# Patient Record
Sex: FEMALE | Race: WHITE | Employment: OTHER | URBAN - METROPOLITAN AREA
[De-identification: names, ages, dates, MRNs, and addresses within clinical notes are randomized per-mention and may not be internally consistent; named-entity substitution may affect disease eponyms.]

---

## 2018-04-03 ENCOUNTER — HOSPITAL ENCOUNTER (OUTPATIENT)
Dept: MRI IMAGING | Age: 73
Discharge: HOME | End: 2018-04-03
Attending: ORTHOPAEDIC SURGERY
Payer: MEDICARE

## 2018-04-03 DIAGNOSIS — M43.16 SPONDYLOLISTHESIS OF LUMBAR REGION: ICD-10-CM

## 2018-04-03 PROCEDURE — 72148 MRI LUMBAR SPINE W/O DYE: CPT

## 2018-04-30 RX ORDER — LUTEIN 6 MG
1 TABLET ORAL
COMMUNITY

## 2018-04-30 RX ORDER — CHOLECALCIFEROL (VITAMIN D3) 125 MCG
2 CAPSULE ORAL
COMMUNITY
End: 2018-05-11

## 2018-04-30 RX ORDER — CLONAZEPAM 2 MG/1
2 TABLET ORAL
COMMUNITY

## 2018-04-30 RX ORDER — GUAIFENESIN 100 MG/5ML
81 LIQUID (ML) ORAL
COMMUNITY
End: 2018-05-11

## 2018-04-30 RX ORDER — FLUTICASONE PROPIONATE 50 MCG
1 SPRAY, SUSPENSION (ML) NASAL
COMMUNITY

## 2018-05-01 ENCOUNTER — HOSPITAL ENCOUNTER (OUTPATIENT)
Dept: PREADMISSION TESTING | Age: 73
Discharge: HOME | End: 2018-05-01
Payer: MEDICARE

## 2018-05-01 VITALS
BODY MASS INDEX: 25.56 KG/M2 | HEART RATE: 66 BPM | OXYGEN SATURATION: 99 % | SYSTOLIC BLOOD PRESSURE: 139 MMHG | RESPIRATION RATE: 20 BRPM | WEIGHT: 149.69 LBS | TEMPERATURE: 98.4 F | HEIGHT: 64 IN | DIASTOLIC BLOOD PRESSURE: 64 MMHG

## 2018-05-01 LAB
ABO + RH BLD: NORMAL
ALBUMIN SERPL-MCNC: 4 G/DL (ref 3.5–5)
ALBUMIN/GLOB SERPL: 1.4 {RATIO} (ref 1.1–2.2)
ALP SERPL-CCNC: 69 U/L (ref 45–117)
ALT SERPL-CCNC: 26 U/L (ref 12–78)
ANION GAP SERPL CALC-SCNC: 5 MMOL/L (ref 5–15)
APPEARANCE UR: CLEAR
APTT PPP: 26.5 SEC (ref 22.1–32)
AST SERPL-CCNC: 14 U/L (ref 15–37)
ATRIAL RATE: 50 BPM
BACTERIA URNS QL MICRO: NEGATIVE /HPF
BASOPHILS # BLD: 0 K/UL (ref 0–0.1)
BASOPHILS NFR BLD: 0 % (ref 0–1)
BILIRUB SERPL-MCNC: 0.3 MG/DL (ref 0.2–1)
BILIRUB UR QL: NEGATIVE
BLOOD GROUP ANTIBODIES SERPL: NORMAL
BUN SERPL-MCNC: 14 MG/DL (ref 6–20)
BUN/CREAT SERPL: 19 (ref 12–20)
CALCIUM SERPL-MCNC: 9.3 MG/DL (ref 8.5–10.1)
CALCULATED P AXIS, ECG09: 21 DEGREES
CALCULATED R AXIS, ECG10: 0 DEGREES
CALCULATED T AXIS, ECG11: 26 DEGREES
CHLORIDE SERPL-SCNC: 105 MMOL/L (ref 97–108)
CO2 SERPL-SCNC: 33 MMOL/L (ref 21–32)
COLOR UR: NORMAL
CREAT SERPL-MCNC: 0.75 MG/DL (ref 0.55–1.02)
DIAGNOSIS, 93000: NORMAL
DIFFERENTIAL METHOD BLD: NORMAL
EOSINOPHIL # BLD: 0.1 K/UL (ref 0–0.4)
EOSINOPHIL NFR BLD: 1 % (ref 0–7)
EPITH CASTS URNS QL MICRO: NORMAL /LPF
ERYTHROCYTE [DISTWIDTH] IN BLOOD BY AUTOMATED COUNT: 13.9 % (ref 11.5–14.5)
EST. AVERAGE GLUCOSE BLD GHB EST-MCNC: 114 MG/DL
GLOBULIN SER CALC-MCNC: 2.9 G/DL (ref 2–4)
GLUCOSE SERPL-MCNC: 85 MG/DL (ref 65–100)
GLUCOSE UR STRIP.AUTO-MCNC: NEGATIVE MG/DL
HBA1C MFR BLD: 5.6 % (ref 4.2–6.3)
HCT VFR BLD AUTO: 43.1 % (ref 35–47)
HGB BLD-MCNC: 13.8 G/DL (ref 11.5–16)
HGB UR QL STRIP: NEGATIVE
HYALINE CASTS URNS QL MICRO: NORMAL /LPF (ref 0–5)
IMM GRANULOCYTES # BLD: 0 K/UL (ref 0–0.04)
IMM GRANULOCYTES NFR BLD AUTO: 0 % (ref 0–0.5)
INR PPP: 0.9 (ref 0.9–1.1)
KETONES UR QL STRIP.AUTO: NEGATIVE MG/DL
LEUKOCYTE ESTERASE UR QL STRIP.AUTO: NEGATIVE
LYMPHOCYTES # BLD: 1.8 K/UL (ref 0.8–3.5)
LYMPHOCYTES NFR BLD: 25 % (ref 12–49)
MCH RBC QN AUTO: 30.8 PG (ref 26–34)
MCHC RBC AUTO-ENTMCNC: 32 G/DL (ref 30–36.5)
MCV RBC AUTO: 96.2 FL (ref 80–99)
MONOCYTES # BLD: 0.4 K/UL (ref 0–1)
MONOCYTES NFR BLD: 6 % (ref 5–13)
NEUTS SEG # BLD: 4.8 K/UL (ref 1.8–8)
NEUTS SEG NFR BLD: 68 % (ref 32–75)
NITRITE UR QL STRIP.AUTO: NEGATIVE
NRBC # BLD: 0 K/UL (ref 0–0.01)
NRBC BLD-RTO: 0 PER 100 WBC
P-R INTERVAL, ECG05: 182 MS
PH UR STRIP: 5.5 [PH] (ref 5–8)
PLATELET # BLD AUTO: 195 K/UL (ref 150–400)
PMV BLD AUTO: 11.8 FL (ref 8.9–12.9)
POTASSIUM SERPL-SCNC: 3.7 MMOL/L (ref 3.5–5.1)
PROT SERPL-MCNC: 6.9 G/DL (ref 6.4–8.2)
PROT UR STRIP-MCNC: NEGATIVE MG/DL
PROTHROMBIN TIME: 9.5 SEC (ref 9–11.1)
Q-T INTERVAL, ECG07: 436 MS
QRS DURATION, ECG06: 88 MS
QTC CALCULATION (BEZET), ECG08: 397 MS
RBC # BLD AUTO: 4.48 M/UL (ref 3.8–5.2)
RBC #/AREA URNS HPF: NORMAL /HPF (ref 0–5)
SODIUM SERPL-SCNC: 143 MMOL/L (ref 136–145)
SP GR UR REFRACTOMETRY: 1.01 (ref 1–1.03)
SPECIMEN EXP DATE BLD: NORMAL
THERAPEUTIC RANGE,PTTT: NORMAL SECS (ref 58–77)
UA: UC IF INDICATED,UAUC: NORMAL
UROBILINOGEN UR QL STRIP.AUTO: 0.2 EU/DL (ref 0.2–1)
VENTRICULAR RATE, ECG03: 50 BPM
WBC # BLD AUTO: 7.1 K/UL (ref 3.6–11)
WBC URNS QL MICRO: NORMAL /HPF (ref 0–4)

## 2018-05-01 PROCEDURE — 93005 ELECTROCARDIOGRAM TRACING: CPT

## 2018-05-01 PROCEDURE — 85025 COMPLETE CBC W/AUTO DIFF WBC: CPT | Performed by: ORTHOPAEDIC SURGERY

## 2018-05-01 PROCEDURE — 85610 PROTHROMBIN TIME: CPT | Performed by: ORTHOPAEDIC SURGERY

## 2018-05-01 PROCEDURE — 80053 COMPREHEN METABOLIC PANEL: CPT | Performed by: ORTHOPAEDIC SURGERY

## 2018-05-01 PROCEDURE — 36415 COLL VENOUS BLD VENIPUNCTURE: CPT | Performed by: ORTHOPAEDIC SURGERY

## 2018-05-01 PROCEDURE — 81001 URINALYSIS AUTO W/SCOPE: CPT | Performed by: ORTHOPAEDIC SURGERY

## 2018-05-01 PROCEDURE — 85730 THROMBOPLASTIN TIME PARTIAL: CPT | Performed by: ORTHOPAEDIC SURGERY

## 2018-05-01 PROCEDURE — 83036 HEMOGLOBIN GLYCOSYLATED A1C: CPT | Performed by: ORTHOPAEDIC SURGERY

## 2018-05-01 PROCEDURE — 86900 BLOOD TYPING SEROLOGIC ABO: CPT | Performed by: ORTHOPAEDIC SURGERY

## 2018-05-01 NOTE — H&P
PAT Pre-Op History & Physical    Patient: Jay Molina                  MRN: [id number]          SSN: [id number]  YOB: 1945          Age: 81 y.o. Sex: female                Subjective:     Patient is a 81 y.o.  female who presents with history of low back pain that has been going on for years and gotten progressively worse over the last six months. She has limited ROM with bending. Pain is across lower back and down left leg to below her knee. She has recently noticed the left leg starting to have pain also. She describes the pain as shooting that will go up her back. Pain ranges from 4/10-9/10. Nothing specific makes the pain worse. Sleep is affected. She has failed PT, Tramadol, NSAID, ncarcotics, muscle relaxants. The patient was evaluated in the surgeon's office and it was determined that the most appropriate plan of care is to proceed with surgical intervention. Patient's PCP Nancie Vincent MD      Past Medical History:   Diagnosis Date    Colitis, ischemic ([facility] [address] 2017    Headache     Other ill-defined conditions(799.89)     high cholesterol    Other ill-defined conditions(799.89)     sinus congestion    Thyroid disease     hypothyroidism      Past Surgical History:   Procedure Laterality Date    BREAST SURGERY PROCEDURE UNLISTED  1969    cyst on left breast    HX CERVICAL FUSION N/A     HX COLONOSCOPY  2017    HX HYSTERECTOMY  1983    HX ORTHOPAEDIC  2001    right shoulder    HX OTHER SURGICAL  2005    benign right breast tumor      Prior to Admission medications    Medication Sig Start Date End Date Taking? Authorizing Provider   multivit with min-folic acid (ADULT MULTIVITAMIN GUMMIES) 200 mcg chew Take 2 Gum by mouth daily. Yes Historical Provider   clonazePAM (KLONOPIN) 2 mg tablet Take 2 mg by mouth nightly. Yes Historical Provider   aspirin 81 mg chewable tablet Take 81 mg by mouth nightly.    Yes Historical Provider   lutein 6 mg tab Take 1 Tab by mouth nightly. Indications: eye health   Yes Historical Provider   fluticasone (FLONASE) 50 mcg/actuation nasal spray 1 [place] by Both Nostrils route nightly. Indications: Allergic Rhinitis   Yes Historical Provider   naproxen sodium 220 mg cap Take 2 Tabs by mouth daily as needed. Indications: OSTEOARTHRITIS   Yes Historical Provider   PSEUDOEPHEDRINE/ACETAMINOPHEN (TYLENOL SINUS PO) Take 1 Tab by mouth two (2) times a day. Yes Historical Provider   levothyroxine (SYNTHROID) 50 mcg tablet Take 50 mcg by mouth every morning. Yes Historical Provider   ATORVASTATIN CALCIUM (ATORVASTATIN PO) Take 10 mg by mouth nightly. Yes Historical Provider        No Known Allergies     Social History   Substance Use Topics    Smoking status: Never Smoker    Smokeless tobacco: Never Used    Alcohol use 0.5 oz/week     1 Glasses of wine per week      History   Drug Use No     Family History   Problem Relation Age of Onset    Stroke Mother     Heart Disease Mother     Lung Disease Mother     Lung Disease Father     Heart Disease Father     Emphysema Father     Heart Disease Brother     Diabetes Brother     No Known Problems Brother     Malignant Hyperthermia Neg Hx     Pseudocholinesterase Deficiency Neg Hx     Delayed Awakening Neg Hx     Post-op Nausea/Vomiting Neg Hx     Emergence Delirium Neg Hx     Post-op Cognitive Dysfunction Neg Hx          Review of Systems    Patient denies difficulty swallowing, mouth sores, or loose teeth. Patient denies any recent dental procedures or any planned prior to surgery. Patient denies chest pain, tightness, pain radiating down left arm, palpitations. Denies dizziness, visual disturbances, or lightheadedness. Patient denies shortness of breath, wheezing, cough, fever, or chills. Patient denies diarrhea, constipation, or abdominal pain. Patient denies urinary problems including dysuria, hesitancy, urgency, or incontinence.  Denies skin breakdown, rashes, insect bites or open area. Objective:     Patient Vitals for the past 24 hrs:   Temp Pulse Resp BP SpO2   18 1255 98.4 °F (36.9 °C) 66 20 139/64 99 %     Temp (24hrs), Av.4 °F (36.9 °C), Min:98.4 °F (36.9 °C), Max:98.4 °F (36.9 °C)    Body mass index is 25.69 kg/(m^2). Wt Readings from Last 1 Encounters:   18 67.9 kg (149 lb 11.1 oz)        Physical Exam:     General: Pleasant,  cooperative, no apparent distress, appears stated age. Eyes: Conjunctivae/corneas clear. EOMs intact. Nose: Nares normal.   Mouth/Throat: Lips, mucosa, and tongue normal. Teeth and gums normal.   Lungs: Clear to auscultation bilaterally. Heart: Regular rate and rhythm, S1, S2 normal. No murmur, click, rub or gallop. Abdomen: Soft, non-tender. Bowel sounds normal. No distention. Musculoskeletal:  Unremarkable. Extremities:  Extremities normal, atraumatic, no cyanosis or edema. Calves                                 supple, non tender to palpation. Pulses: 2+ and symmetric bilateral upper extremities. Cap. refill <2 seconds   Skin: Skin color, texture, turgor normal. No visible open areas, examined fully clothed   Neurologic: CN II-XII grossly intact. Alert and oriented x3. Labs:   Recent Results (from the past 72 hour(s))   CBC WITH AUTOMATED DIFF    Collection Time: 18  1:28 PM   Result Value Ref Range    WBC 7.1 3.6 - 11.0 K/uL    RBC 4.48 3.80 - 5.20 M/uL    HGB 13.8 11.5 - 16.0 g/dL    HCT 43.1 35.0 - 47.0 %    MCV 96.2 80.0 - 99.0 FL    MCH 30.8 26.0 - 34.0 PG    MCHC 32.0 30.0 - 36.5 g/dL    RDW 13.9 11.5 - 14.5 %    PLATELET 688 017 - 314 K/uL    MPV 11.8 8.9 - 12.9 FL    NRBC 0.0 0  WBC    ABSOLUTE NRBC 0.00 0.00 - 0.01 K/uL    NEUTROPHILS 68 32 - 75 %    LYMPHOCYTES 25 12 - 49 %    MONOCYTES 6 5 - 13 %    EOSINOPHILS 1 0 - 7 %    BASOPHILS 0 0 - 1 %    IMMATURE GRANULOCYTES 0 0.0 - 0.5 %    ABS. NEUTROPHILS 4.8 1.8 - 8.0 K/UL    ABS. LYMPHOCYTES 1.8 0.8 - 3.5 K/UL    ABS.  MONOCYTES 0.4 0.0 - 1.0 K/UL    ABS. EOSINOPHILS 0.1 0.0 - 0.4 K/UL    ABS. BASOPHILS 0.0 0.0 - 0.1 K/UL    ABS. IMM. GRANS. 0.0 0.00 - 0.04 K/UL    DF AUTOMATED     METABOLIC PANEL, COMPREHENSIVE    Collection Time: 05/01/18  1:28 PM   Result Value Ref Range    Sodium 143 136 - 145 mmol/L    Potassium 3.7 3.5 - 5.1 mmol/L    Chloride 105 97 - 108 mmol/L    CO2 33 (H) 21 - 32 mmol/L    Anion gap 5 5 - 15 mmol/L    Glucose 85 65 - 100 mg/dL    BUN 14 6 - 20 MG/DL    Creatinine 0.75 0.55 - 1.02 MG/DL    BUN/Creatinine ratio 19 12 - 20      GFR est AA >60 >60 ml/min/1.73m2    GFR est non-AA >60 >60 ml/min/1.73m2    Calcium 9.3 8.5 - 10.1 MG/DL    Bilirubin, total 0.3 0.2 - 1.0 MG/DL    ALT (SGPT) 26 12 - 78 U/L    AST (SGOT) 14 (L) 15 - 37 U/L    Alk.  phosphatase 69 45 - 117 U/L    Protein, total 6.9 6.4 - 8.2 g/dL    Albumin 4.0 3.5 - 5.0 g/dL    Globulin 2.9 2.0 - 4.0 g/dL    A-G Ratio 1.4 1.1 - 2.2     HEMOGLOBIN A1C WITH EAG    Collection Time: 05/01/18  1:28 PM   Result Value Ref Range    Hemoglobin A1c 5.6 4.2 - 6.3 %    Est. average glucose 114 mg/dL   CULTURE, MRSA    Collection Time: 05/01/18  1:28 PM   Result Value Ref Range    Special Requests: NO SPECIAL REQUESTS      Culture result: MRSA NOT PRESENT AT 14 HOURS     PROTHROMBIN TIME + INR    Collection Time: 05/01/18  1:28 PM   Result Value Ref Range    INR 0.9 0.9 - 1.1      Prothrombin time 9.5 9.0 - 11.1 sec   PTT    Collection Time: 05/01/18  1:28 PM   Result Value Ref Range    aPTT 26.5 22.1 - 32.0 sec    aPTT, therapeutic range     58.0 - 77.0 SECS   URINALYSIS W/ REFLEX CULTURE    Collection Time: 05/01/18  1:28 PM   Result Value Ref Range    Color YELLOW/STRAW      Appearance CLEAR CLEAR      Specific gravity 1.014 1.003 - 1.030      pH (UA) 5.5 5.0 - 8.0      Protein NEGATIVE  NEG mg/dL    Glucose NEGATIVE  NEG mg/dL    Ketone NEGATIVE  NEG mg/dL    Bilirubin NEGATIVE  NEG      Blood NEGATIVE  NEG      Urobilinogen 0.2 0.2 - 1.0 EU/dL    Nitrites NEGATIVE NEG      Leukocyte Esterase NEGATIVE  NEG      WBC 0-4 0 - 4 /hpf    RBC 0-5 0 - 5 /hpf    Epithelial cells FEW FEW /lpf    Bacteria NEGATIVE  NEG /hpf    UA:UC IF INDICATED CULTURE NOT INDICATED BY UA RESULT CNI      Hyaline cast 0-2 0 - 5 /lpf   TYPE & SCREEN    Collection Time: 05/01/18  1:28 PM   Result Value Ref Range    Crossmatch Expiration 05/10/2018     ABO/Rh(D) O POSITIVE     Antibody screen NEG    EKG, 12 LEAD, INITIAL    Collection Time: 05/01/18  1:46 PM   Result Value Ref Range    Ventricular Rate 50 BPM    Atrial Rate 50 BPM    P-R Interval 182 ms    QRS Duration 88 ms    Q-T Interval 436 ms    QTC Calculation (Bezet) 397 ms    Calculated P Axis 21 degrees    Calculated R Axis 0 degrees    Calculated T Axis 26 degrees    Diagnosis       Sinus bradycardia  Low voltage QRS  Borderline ECG  When compared with ECG of 30-JAN-2013 09:24,  No significant change was found  Confirmed by Marlene Salcido MD., [employer] Finder ([id number]) on 5/1/2018 2:52:09 PM         Assessment:     Lumbar adjacent segment disease with spondylolisthesis [M51.36, M43.16]  Lumbar stenosis with neurogenic claudication [M48.062]    Plan:     Scheduled for L2-L5 LAMINECTOMY, L3-L5 FUSION, INSTRUMENTATION, TLIF, BONE GRAFT . All labs reviewed and unremarkable. EKG reviewed.  MRSA pending    Elinor Hooper NP

## 2018-05-01 NOTE — PERIOP NOTES
[facility] [address]    MAIN OR [phone]    MAIN PRE OP [phone]    AMBULATORY PRE OP [phone]    PRE-ADMISSION TESTING [phone]       Surgery Date:   Monday 5/7/18        Is surgery arrival time given by surgeon? NO  If NO, [facility] staff will call you between 3 and 7pm the day before your surgery with your arrival time. (If your surgery is on a Monday, we will call you the Friday before.)    Call [phone] after 7pm Monday-Friday if you did not receive your arrival time.     Answers to Common Questions   When You  Arrive   Arrive at the [facility] [address] [facility] on the day of your surgery  Have your insurance card, photo ID, and any copayment (if needed)     Food   and   Drink NO food or drink after midnight the night before surgery    This means NO water, gum, mints, coffee, juice, etc.  No alcohol (beer, wine, liquor) 24 hours before and after surgery     Medicine to   TAKE   Morning of Surgery   MEDICATIONS TO TAKE THE MORNING OF SURGERY WITH A SIP OF WATER:    Flonase, Levothyroxine     Medicine  To  STOP FOR PAIN   NO Aspirin for pain    NO Non-Steroidal Anti-Inflammatory Drugs (NSAIDs:   for example, Ibuprofen (Advil, Motrin), Naproxen (Aleve)   STOP herbal supplements and vitamins 1 week before surgery   You can take Tylenol  follow instructions on the bottle     Blood  Thinners    If you take Aspirin, Plavix, Coumadin, blood-thinning or anti-clot medicine, talk to your surgeon and/or follow the instructions from the doctor who told you to take that medicine     Clothing  Jewelry  Valuables  Bathing   CLOTHING   Wear loose, comfortable clothes   Wear glasses instead of contacts   Leave money, jewelry and valuables at home   No make-up, particularly mascara, the day of surgery   REMOVE ALL piercings, rings, and jewelry - leave at home   Wear hair loose or down; no pony-tails, buns, or metal hair clips    BATHING   Follow all special bath instructions (for total joint replacement, spine and bowel surgeries.)   If you shower the morning of surgery, please do not apply any lotions, powders, or deodorants afterwards. Do not shave or trim anywhere 24 hours before surgery. Going Home  or  Spending the Night    SAME-DAY SURGERY: You must have a responsible adult drive you home and stay with you 24 hours after surgery   ADMITS: If your doctor is keeping you into the hospital after surgery, leave personal belongings/luggage in your car until you have a hospital room number. Hospital discharge time is 12 noon  Drivers must be here before 12 noon unless you are told differently         Follow all instructions so your surgery wont be cancelled. Please, be on time. If a situation occurs and you are delayed the day of surgery, call [phone] or [phone]. If your physical condition changes (like a fever, cold, flu, etc.) call your surgeon as soon as possible. The Preadmission Testing staff can be reached at [phone]. OTHER SPECIAL INSTRUCTIONS:  Free  parking 7a-5p. Bring completed medication list on day of surgery. Special Instructions:  ·  Use Chlorhexidine Care Fusion wash and sponges 3 days prior to surgery as instructed. · Incentive spirometer given with instructions to practice at home and bring back to the hospital on the day of surgery. · [facility] will [facility] you if your Hemoglobin A1C is greater than 7.5. · Ensure/Glucerna  sample, nutritional information, and Ensure/Glucerna coupon given. · Pain pamphlet and Call Don't Fall reminder reviewed with patient. ·  parking is complimentary Monday - Friday 7 am - 5 pm  · Bring PTA Medication list day of surgery with the last doses taken documented   · Do not bring medication bottles the day of surgery    The patient was contacted  in person.    She verbalize  understanding of all instructions and does not  need reinforcement.

## 2018-05-02 LAB
BACTERIA SPEC CULT: NORMAL
BACTERIA SPEC CULT: NORMAL
SERVICE CMNT-IMP: NORMAL

## 2018-05-04 ENCOUNTER — ANESTHESIA EVENT (OUTPATIENT)
Dept: SURGERY | Age: 73
DRG: 455 | End: 2018-05-04
Payer: MEDICARE

## 2018-05-07 ENCOUNTER — ANESTHESIA (OUTPATIENT)
Dept: SURGERY | Age: 73
DRG: 455 | End: 2018-05-07
Payer: MEDICARE

## 2018-05-07 ENCOUNTER — HOSPITAL ENCOUNTER (INPATIENT)
Age: 73
LOS: 4 days | Discharge: HOME HEALTH | DRG: 455 | End: 2018-05-11
Attending: ORTHOPAEDIC SURGERY | Admitting: ORTHOPAEDIC SURGERY
Payer: MEDICARE

## 2018-05-07 ENCOUNTER — APPOINTMENT (OUTPATIENT)
Dept: GENERAL RADIOLOGY | Age: 73
DRG: 455 | End: 2018-05-07
Attending: ORTHOPAEDIC SURGERY
Payer: MEDICARE

## 2018-05-07 DIAGNOSIS — M48.062 LUMBAR STENOSIS WITH NEUROGENIC CLAUDICATION: Primary | ICD-10-CM

## 2018-05-07 PROCEDURE — 74011250636 HC RX REV CODE- 250/636

## 2018-05-07 PROCEDURE — 77030012406 HC DRN WND PENRS BARD -A: Performed by: ORTHOPAEDIC SURGERY

## 2018-05-07 PROCEDURE — 77030032490 HC SLV COMPR SCD KNE COVD -B: Performed by: ORTHOPAEDIC SURGERY

## 2018-05-07 PROCEDURE — 0SG1071 FUSION OF 2 OR MORE LUMBAR VERTEBRAL JOINTS WITH AUTOLOGOUS TISSUE SUBSTITUTE, POSTERIOR APPROACH, POSTERIOR COLUMN, OPEN APPROACH: ICD-10-PCS | Performed by: ORTHOPAEDIC SURGERY

## 2018-05-07 PROCEDURE — 76060000039 HC ANESTHESIA 4 TO 4.5 HR: Performed by: ORTHOPAEDIC SURGERY

## 2018-05-07 PROCEDURE — 77030018719 HC DRSG PTCH ANTIMIC J&J -A: Performed by: ORTHOPAEDIC SURGERY

## 2018-05-07 PROCEDURE — 74011000272 HC RX REV CODE- 272: Performed by: ORTHOPAEDIC SURGERY

## 2018-05-07 PROCEDURE — C1713 ANCHOR/SCREW BN/BN,TIS/BN: HCPCS | Performed by: ORTHOPAEDIC SURGERY

## 2018-05-07 PROCEDURE — 76001 XR FLUOROSCOPY OVER 60 MINUTES: CPT

## 2018-05-07 PROCEDURE — 0SB20ZZ EXCISION OF LUMBAR VERTEBRAL DISC, OPEN APPROACH: ICD-10-PCS | Performed by: ORTHOPAEDIC SURGERY

## 2018-05-07 PROCEDURE — 77030002933 HC SUT MCRYL J&J -A: Performed by: ORTHOPAEDIC SURGERY

## 2018-05-07 PROCEDURE — 74011250636 HC RX REV CODE- 250/636: Performed by: ORTHOPAEDIC SURGERY

## 2018-05-07 PROCEDURE — 77030034850: Performed by: ORTHOPAEDIC SURGERY

## 2018-05-07 PROCEDURE — 77030026188 HC BN CANC CHP CRSH PR LIFV -E: Performed by: ORTHOPAEDIC SURGERY

## 2018-05-07 PROCEDURE — 77030039267 HC ADH SKN EXOFIN S2SG -B: Performed by: ORTHOPAEDIC SURGERY

## 2018-05-07 PROCEDURE — 77030020061 HC IV BLD WRMR ADMIN SET 3M -B: Performed by: ANESTHESIOLOGY

## 2018-05-07 PROCEDURE — 74011000250 HC RX REV CODE- 250

## 2018-05-07 PROCEDURE — 76210000006 HC OR PH I REC 0.5 TO 1 HR: Performed by: ORTHOPAEDIC SURGERY

## 2018-05-07 PROCEDURE — 77030031139 HC SUT VCRL2 J&J -A: Performed by: ORTHOPAEDIC SURGERY

## 2018-05-07 PROCEDURE — 74011250636 HC RX REV CODE- 250/636: Performed by: ANESTHESIOLOGY

## 2018-05-07 PROCEDURE — 51798 US URINE CAPACITY MEASURE: CPT

## 2018-05-07 PROCEDURE — 74011250637 HC RX REV CODE- 250/637: Performed by: ORTHOPAEDIC SURGERY

## 2018-05-07 PROCEDURE — 77030002982 HC SUT POLYSRB J&J -A: Performed by: ORTHOPAEDIC SURGERY

## 2018-05-07 PROCEDURE — 0SG10AJ FUSION OF 2 OR MORE LUMBAR VERTEBRAL JOINTS WITH INTERBODY FUSION DEVICE, POSTERIOR APPROACH, ANTERIOR COLUMN, OPEN APPROACH: ICD-10-PCS | Performed by: ORTHOPAEDIC SURGERY

## 2018-05-07 PROCEDURE — 77030008684 HC TU ET CUF COVD -B: Performed by: ANESTHESIOLOGY

## 2018-05-07 PROCEDURE — 77030037202 HC SPCR SPN BULL TIP PEK VBR IBF REGE -I1: Performed by: ORTHOPAEDIC SURGERY

## 2018-05-07 PROCEDURE — 77030013079 HC BLNKT BAIR HGGR 3M -A: Performed by: ANESTHESIOLOGY

## 2018-05-07 PROCEDURE — 77030037134 HC WRAP COMPR BACK THER SOLM -B

## 2018-05-07 PROCEDURE — 77030030107 HC BLD BN MILL DISP STRY -C: Performed by: ORTHOPAEDIC SURGERY

## 2018-05-07 PROCEDURE — 77030026438 HC STYL ET INTUB CARD -A: Performed by: ANESTHESIOLOGY

## 2018-05-07 PROCEDURE — 76010000175 HC OR TIME 4 TO 4.5 HR INTENSV-TIER 1: Performed by: ORTHOPAEDIC SURGERY

## 2018-05-07 PROCEDURE — 65270000029 HC RM PRIVATE

## 2018-05-07 PROCEDURE — 77030004391 HC BUR FLUT MEDT -C: Performed by: ORTHOPAEDIC SURGERY

## 2018-05-07 PROCEDURE — 77030029099 HC BN WAX SSPC -A: Performed by: ORTHOPAEDIC SURGERY

## 2018-05-07 PROCEDURE — 77030034274 HC GRFT BN CHIP ALLGRFT 10CC REGE -I: Performed by: ORTHOPAEDIC SURGERY

## 2018-05-07 PROCEDURE — 77030003161 HC GRFT DURA MTRX INLC -E: Performed by: ORTHOPAEDIC SURGERY

## 2018-05-07 PROCEDURE — 74011000250 HC RX REV CODE- 250: Performed by: ORTHOPAEDIC SURGERY

## 2018-05-07 PROCEDURE — 77030033067 HC SUT PDO STRATFX SPIR J&J -B: Performed by: ORTHOPAEDIC SURGERY

## 2018-05-07 PROCEDURE — 77030019908 HC STETH ESOPH SIMS -A: Performed by: ANESTHESIOLOGY

## 2018-05-07 PROCEDURE — 77030003666 HC NDL SPINAL BD -A: Performed by: ORTHOPAEDIC SURGERY

## 2018-05-07 PROCEDURE — 77030018723 HC ELCTRD BLD COVD -A: Performed by: ORTHOPAEDIC SURGERY

## 2018-05-07 PROCEDURE — 77030018846 HC SOL IRR STRL H20 ICUM -A: Performed by: ORTHOPAEDIC SURGERY

## 2018-05-07 DEVICE — BONE CHIP CANC CRSH 1-8MM 30ML --: Type: IMPLANTABLE DEVICE | Site: SPINE LUMBAR | Status: FUNCTIONAL

## 2018-05-07 DEVICE — VBR, BULLET-TIP OPTION, 12X22 SPACER
Type: IMPLANTABLE DEVICE | Site: SPINE LUMBAR | Status: FUNCTIONAL
Brand: BULLET-TIP PEEK VBR/IBF SYSTEM

## 2018-05-07 DEVICE — SCR BNE SPNE 6.5X45MM TI -- STREAMLINE TL: Type: IMPLANTABLE DEVICE | Site: SPINE LUMBAR | Status: FUNCTIONAL

## 2018-05-07 DEVICE — GRAFT BNE SUB 10CC CORT CANC DBM CRYOGENICALLY PRESERVED: Type: IMPLANTABLE DEVICE | Site: BACK | Status: FUNCTIONAL

## 2018-05-07 DEVICE — 5.5MM CURVED ROD 60MM TI ALLOY
Type: IMPLANTABLE DEVICE | Site: SPINE LUMBAR | Status: FUNCTIONAL
Brand: TAURUS

## 2018-05-07 DEVICE — SCR SET SPNE STREAMLINE TL --: Type: IMPLANTABLE DEVICE | Site: SPINE LUMBAR | Status: FUNCTIONAL

## 2018-05-07 DEVICE — DURAGEN® SUTURABLE DURAL REGENERATION MATRIX, 2 IN X 2 IN (5 CM X 5 CM)
Type: IMPLANTABLE DEVICE | Site: SPINE LUMBAR | Status: FUNCTIONAL
Brand: DURAGEN® SUTURABLE

## 2018-05-07 RX ORDER — SODIUM CHLORIDE 0.9 % (FLUSH) 0.9 %
5-10 SYRINGE (ML) INJECTION AS NEEDED
Status: DISCONTINUED | OUTPATIENT
Start: 2018-05-07 | End: 2018-05-11 | Stop reason: HOSPADM

## 2018-05-07 RX ORDER — DIPHENHYDRAMINE HYDROCHLORIDE 50 MG/ML
12.5 INJECTION, SOLUTION INTRAMUSCULAR; INTRAVENOUS AS NEEDED
Status: DISCONTINUED | OUTPATIENT
Start: 2018-05-07 | End: 2018-05-07 | Stop reason: HOSPADM

## 2018-05-07 RX ORDER — POLYETHYLENE GLYCOL 3350 17 G/17G
17 POWDER, FOR SOLUTION ORAL DAILY
Status: DISCONTINUED | OUTPATIENT
Start: 2018-05-08 | End: 2018-05-11 | Stop reason: HOSPADM

## 2018-05-07 RX ORDER — CLONAZEPAM 1 MG/1
2 TABLET ORAL
Status: DISCONTINUED | OUTPATIENT
Start: 2018-05-07 | End: 2018-05-11 | Stop reason: HOSPADM

## 2018-05-07 RX ORDER — SODIUM CHLORIDE, SODIUM LACTATE, POTASSIUM CHLORIDE, CALCIUM CHLORIDE 600; 310; 30; 20 MG/100ML; MG/100ML; MG/100ML; MG/100ML
125 INJECTION, SOLUTION INTRAVENOUS CONTINUOUS
Status: DISCONTINUED | OUTPATIENT
Start: 2018-05-07 | End: 2018-05-07 | Stop reason: HOSPADM

## 2018-05-07 RX ORDER — FACIAL-BODY WIPES
10 EACH TOPICAL DAILY PRN
Status: DISCONTINUED | OUTPATIENT
Start: 2018-05-09 | End: 2018-05-11 | Stop reason: HOSPADM

## 2018-05-07 RX ORDER — FAMOTIDINE 20 MG/1
20 TABLET, FILM COATED ORAL 2 TIMES DAILY
Status: DISCONTINUED | OUTPATIENT
Start: 2018-05-07 | End: 2018-05-11 | Stop reason: HOSPADM

## 2018-05-07 RX ORDER — PROPOFOL 10 MG/ML
INJECTION, EMULSION INTRAVENOUS
Status: DISCONTINUED | OUTPATIENT
Start: 2018-05-07 | End: 2018-05-07 | Stop reason: HOSPADM

## 2018-05-07 RX ORDER — NALOXONE HYDROCHLORIDE 0.4 MG/ML
0.4 INJECTION, SOLUTION INTRAMUSCULAR; INTRAVENOUS; SUBCUTANEOUS AS NEEDED
Status: DISCONTINUED | OUTPATIENT
Start: 2018-05-07 | End: 2018-05-11 | Stop reason: HOSPADM

## 2018-05-07 RX ORDER — ROCURONIUM BROMIDE 10 MG/ML
INJECTION, SOLUTION INTRAVENOUS AS NEEDED
Status: DISCONTINUED | OUTPATIENT
Start: 2018-05-07 | End: 2018-05-07 | Stop reason: HOSPADM

## 2018-05-07 RX ORDER — CEFAZOLIN SODIUM/WATER 2 G/20 ML
2 SYRINGE (ML) INTRAVENOUS EVERY 8 HOURS
Status: COMPLETED | OUTPATIENT
Start: 2018-05-08 | End: 2018-05-08

## 2018-05-07 RX ORDER — HYDROMORPHONE HYDROCHLORIDE 2 MG/ML
0.5 INJECTION, SOLUTION INTRAMUSCULAR; INTRAVENOUS; SUBCUTANEOUS
Status: DISPENSED | OUTPATIENT
Start: 2018-05-07 | End: 2018-05-08

## 2018-05-07 RX ORDER — LEVOTHYROXINE SODIUM 50 UG/1
50 TABLET ORAL
Status: DISCONTINUED | OUTPATIENT
Start: 2018-05-08 | End: 2018-05-11 | Stop reason: HOSPADM

## 2018-05-07 RX ORDER — ONDANSETRON 2 MG/ML
INJECTION INTRAMUSCULAR; INTRAVENOUS AS NEEDED
Status: DISCONTINUED | OUTPATIENT
Start: 2018-05-07 | End: 2018-05-07 | Stop reason: HOSPADM

## 2018-05-07 RX ORDER — CYCLOBENZAPRINE HCL 10 MG
10 TABLET ORAL
Status: DISCONTINUED | OUTPATIENT
Start: 2018-05-07 | End: 2018-05-11 | Stop reason: HOSPADM

## 2018-05-07 RX ORDER — SODIUM CHLORIDE 0.9 % (FLUSH) 0.9 %
5-10 SYRINGE (ML) INJECTION EVERY 8 HOURS
Status: DISCONTINUED | OUTPATIENT
Start: 2018-05-08 | End: 2018-05-11 | Stop reason: HOSPADM

## 2018-05-07 RX ORDER — DEXAMETHASONE SODIUM PHOSPHATE 4 MG/ML
INJECTION, SOLUTION INTRA-ARTICULAR; INTRALESIONAL; INTRAMUSCULAR; INTRAVENOUS; SOFT TISSUE AS NEEDED
Status: DISCONTINUED | OUTPATIENT
Start: 2018-05-07 | End: 2018-05-07 | Stop reason: HOSPADM

## 2018-05-07 RX ORDER — SUCCINYLCHOLINE CHLORIDE 20 MG/ML
INJECTION INTRAMUSCULAR; INTRAVENOUS AS NEEDED
Status: DISCONTINUED | OUTPATIENT
Start: 2018-05-07 | End: 2018-05-07 | Stop reason: HOSPADM

## 2018-05-07 RX ORDER — FLUMAZENIL 0.1 MG/ML
0.2 INJECTION INTRAVENOUS
Status: DISCONTINUED | OUTPATIENT
Start: 2018-05-07 | End: 2018-05-07 | Stop reason: HOSPADM

## 2018-05-07 RX ORDER — ATORVASTATIN CALCIUM 10 MG/1
10 TABLET, FILM COATED ORAL
Status: DISCONTINUED | OUTPATIENT
Start: 2018-05-07 | End: 2018-05-11 | Stop reason: HOSPADM

## 2018-05-07 RX ORDER — LIDOCAINE HYDROCHLORIDE 10 MG/ML
0.1 INJECTION, SOLUTION EPIDURAL; INFILTRATION; INTRACAUDAL; PERINEURAL AS NEEDED
Status: DISCONTINUED | OUTPATIENT
Start: 2018-05-07 | End: 2018-05-07 | Stop reason: HOSPADM

## 2018-05-07 RX ORDER — NALOXONE HYDROCHLORIDE 0.4 MG/ML
0.2 INJECTION, SOLUTION INTRAMUSCULAR; INTRAVENOUS; SUBCUTANEOUS
Status: DISCONTINUED | OUTPATIENT
Start: 2018-05-07 | End: 2018-05-07 | Stop reason: HOSPADM

## 2018-05-07 RX ORDER — HYDROMORPHONE HYDROCHLORIDE 2 MG/ML
.25-1 INJECTION, SOLUTION INTRAMUSCULAR; INTRAVENOUS; SUBCUTANEOUS
Status: DISCONTINUED | OUTPATIENT
Start: 2018-05-07 | End: 2018-05-07 | Stop reason: HOSPADM

## 2018-05-07 RX ORDER — PROPOFOL 10 MG/ML
INJECTION, EMULSION INTRAVENOUS AS NEEDED
Status: DISCONTINUED | OUTPATIENT
Start: 2018-05-07 | End: 2018-05-07 | Stop reason: HOSPADM

## 2018-05-07 RX ORDER — MIDAZOLAM HYDROCHLORIDE 1 MG/ML
INJECTION, SOLUTION INTRAMUSCULAR; INTRAVENOUS AS NEEDED
Status: DISCONTINUED | OUTPATIENT
Start: 2018-05-07 | End: 2018-05-07 | Stop reason: HOSPADM

## 2018-05-07 RX ORDER — HYDROMORPHONE HCL IN 0.9% NACL 15 MG/30ML
PATIENT CONTROLLED ANALGESIA VIAL INTRAVENOUS
Status: DISPENSED | OUTPATIENT
Start: 2018-05-07 | End: 2018-05-08

## 2018-05-07 RX ORDER — ONDANSETRON 2 MG/ML
4 INJECTION INTRAMUSCULAR; INTRAVENOUS
Status: DISCONTINUED | OUTPATIENT
Start: 2018-05-07 | End: 2018-05-11 | Stop reason: HOSPADM

## 2018-05-07 RX ORDER — FLUTICASONE PROPIONATE 50 MCG
1 SPRAY, SUSPENSION (ML) NASAL
Status: DISCONTINUED | OUTPATIENT
Start: 2018-05-07 | End: 2018-05-11 | Stop reason: HOSPADM

## 2018-05-07 RX ORDER — DIPHENHYDRAMINE HYDROCHLORIDE 50 MG/ML
12.5 INJECTION, SOLUTION INTRAMUSCULAR; INTRAVENOUS
Status: DISCONTINUED | OUTPATIENT
Start: 2018-05-07 | End: 2018-05-11 | Stop reason: HOSPADM

## 2018-05-07 RX ORDER — ACETAMINOPHEN 325 MG/1
650 TABLET ORAL
Status: DISCONTINUED | OUTPATIENT
Start: 2018-05-07 | End: 2018-05-11 | Stop reason: HOSPADM

## 2018-05-07 RX ORDER — EPHEDRINE SULFATE 50 MG/ML
INJECTION, SOLUTION INTRAVENOUS AS NEEDED
Status: DISCONTINUED | OUTPATIENT
Start: 2018-05-07 | End: 2018-05-07 | Stop reason: HOSPADM

## 2018-05-07 RX ORDER — SODIUM CHLORIDE 9 MG/ML
125 INJECTION, SOLUTION INTRAVENOUS CONTINUOUS
Status: DISPENSED | OUTPATIENT
Start: 2018-05-07 | End: 2018-05-08

## 2018-05-07 RX ORDER — MIDAZOLAM HYDROCHLORIDE 1 MG/ML
2 INJECTION, SOLUTION INTRAMUSCULAR; INTRAVENOUS
Status: DISCONTINUED | OUTPATIENT
Start: 2018-05-07 | End: 2018-05-07 | Stop reason: HOSPADM

## 2018-05-07 RX ORDER — CEFAZOLIN SODIUM/WATER 2 G/20 ML
2 SYRINGE (ML) INTRAVENOUS ONCE
Status: DISCONTINUED | OUTPATIENT
Start: 2018-05-07 | End: 2018-05-07

## 2018-05-07 RX ORDER — FENTANYL CITRATE 50 UG/ML
INJECTION, SOLUTION INTRAMUSCULAR; INTRAVENOUS AS NEEDED
Status: DISCONTINUED | OUTPATIENT
Start: 2018-05-07 | End: 2018-05-07 | Stop reason: HOSPADM

## 2018-05-07 RX ORDER — CEFAZOLIN SODIUM/WATER 2 G/20 ML
2 SYRINGE (ML) INTRAVENOUS ONCE
Status: COMPLETED | OUTPATIENT
Start: 2018-05-07 | End: 2018-05-07

## 2018-05-07 RX ORDER — AMOXICILLIN 250 MG
1 CAPSULE ORAL 2 TIMES DAILY
Status: DISCONTINUED | OUTPATIENT
Start: 2018-05-07 | End: 2018-05-11 | Stop reason: HOSPADM

## 2018-05-07 RX ORDER — LIDOCAINE HYDROCHLORIDE 20 MG/ML
INJECTION, SOLUTION EPIDURAL; INFILTRATION; INTRACAUDAL; PERINEURAL AS NEEDED
Status: DISCONTINUED | OUTPATIENT
Start: 2018-05-07 | End: 2018-05-07 | Stop reason: HOSPADM

## 2018-05-07 RX ORDER — OXYCODONE HYDROCHLORIDE 5 MG/1
10 TABLET ORAL
Status: DISCONTINUED | OUTPATIENT
Start: 2018-05-07 | End: 2018-05-09

## 2018-05-07 RX ORDER — OXYCODONE HYDROCHLORIDE 5 MG/1
5 TABLET ORAL
Status: DISCONTINUED | OUTPATIENT
Start: 2018-05-07 | End: 2018-05-09

## 2018-05-07 RX ORDER — VANCOMYCIN HYDROCHLORIDE 1 G/20ML
INJECTION, POWDER, LYOPHILIZED, FOR SOLUTION INTRAVENOUS AS NEEDED
Status: DISCONTINUED | OUTPATIENT
Start: 2018-05-07 | End: 2018-05-07 | Stop reason: HOSPADM

## 2018-05-07 RX ADMIN — FENTANYL CITRATE 200 MCG: 50 INJECTION, SOLUTION INTRAMUSCULAR; INTRAVENOUS at 16:04

## 2018-05-07 RX ADMIN — SODIUM CHLORIDE 125 ML/HR: 900 INJECTION, SOLUTION INTRAVENOUS at 20:17

## 2018-05-07 RX ADMIN — ROCURONIUM BROMIDE 15 MG: 10 INJECTION, SOLUTION INTRAVENOUS at 16:51

## 2018-05-07 RX ADMIN — EPHEDRINE SULFATE 10 MG: 50 INJECTION, SOLUTION INTRAVENOUS at 19:17

## 2018-05-07 RX ADMIN — LIDOCAINE HYDROCHLORIDE 40 MG: 20 INJECTION, SOLUTION EPIDURAL; INFILTRATION; INTRACAUDAL; PERINEURAL at 16:04

## 2018-05-07 RX ADMIN — ATORVASTATIN CALCIUM 10 MG: 10 TABLET, FILM COATED ORAL at 22:22

## 2018-05-07 RX ADMIN — FENTANYL CITRATE 50 MCG: 50 INJECTION, SOLUTION INTRAMUSCULAR; INTRAVENOUS at 18:22

## 2018-05-07 RX ADMIN — CLONAZEPAM 2 MG: 1 TABLET ORAL at 22:22

## 2018-05-07 RX ADMIN — SODIUM CHLORIDE, POTASSIUM CHLORIDE, SODIUM LACTATE AND CALCIUM CHLORIDE: 600; 310; 30; 20 INJECTION, SOLUTION INTRAVENOUS at 16:09

## 2018-05-07 RX ADMIN — MIDAZOLAM HYDROCHLORIDE 2 MG: 1 INJECTION, SOLUTION INTRAMUSCULAR; INTRAVENOUS at 15:57

## 2018-05-07 RX ADMIN — DEXAMETHASONE SODIUM PHOSPHATE 8 MG: 4 INJECTION, SOLUTION INTRA-ARTICULAR; INTRALESIONAL; INTRAMUSCULAR; INTRAVENOUS; SOFT TISSUE at 16:45

## 2018-05-07 RX ADMIN — ROCURONIUM BROMIDE 5 MG: 10 INJECTION, SOLUTION INTRAVENOUS at 16:04

## 2018-05-07 RX ADMIN — MIDAZOLAM HYDROCHLORIDE 3 MG: 1 INJECTION, SOLUTION INTRAMUSCULAR; INTRAVENOUS at 16:00

## 2018-05-07 RX ADMIN — FLUTICASONE PROPIONATE 1 SPRAY: 50 SPRAY, METERED NASAL at 22:32

## 2018-05-07 RX ADMIN — ONDANSETRON 4 MG: 2 INJECTION INTRAMUSCULAR; INTRAVENOUS at 18:55

## 2018-05-07 RX ADMIN — SODIUM CHLORIDE, SODIUM LACTATE, POTASSIUM CHLORIDE, AND CALCIUM CHLORIDE: 600; 310; 30; 20 INJECTION, SOLUTION INTRAVENOUS at 18:02

## 2018-05-07 RX ADMIN — PROPOFOL 150 MG: 10 INJECTION, EMULSION INTRAVENOUS at 16:04

## 2018-05-07 RX ADMIN — FAMOTIDINE 20 MG: 20 TABLET ORAL at 22:22

## 2018-05-07 RX ADMIN — SODIUM CHLORIDE, SODIUM LACTATE, POTASSIUM CHLORIDE, AND CALCIUM CHLORIDE 125 ML/HR: 600; 310; 30; 20 INJECTION, SOLUTION INTRAVENOUS at 13:34

## 2018-05-07 RX ADMIN — EPHEDRINE SULFATE 10 MG: 50 INJECTION, SOLUTION INTRAVENOUS at 18:52

## 2018-05-07 RX ADMIN — Medication: at 20:15

## 2018-05-07 RX ADMIN — EPHEDRINE SULFATE 10 MG: 50 INJECTION, SOLUTION INTRAVENOUS at 16:32

## 2018-05-07 RX ADMIN — SUCCINYLCHOLINE CHLORIDE 100 MG: 20 INJECTION INTRAMUSCULAR; INTRAVENOUS at 16:04

## 2018-05-07 RX ADMIN — FENTANYL CITRATE 50 MCG: 50 INJECTION, SOLUTION INTRAMUSCULAR; INTRAVENOUS at 17:28

## 2018-05-07 RX ADMIN — EPHEDRINE SULFATE 20 MG: 50 INJECTION, SOLUTION INTRAVENOUS at 16:10

## 2018-05-07 RX ADMIN — Medication 2 G: at 16:34

## 2018-05-07 RX ADMIN — PROPOFOL 75 MCG/KG/MIN: 10 INJECTION, EMULSION INTRAVENOUS at 16:14

## 2018-05-07 RX ADMIN — FENTANYL CITRATE 50 MCG: 50 INJECTION, SOLUTION INTRAMUSCULAR; INTRAVENOUS at 15:57

## 2018-05-07 RX ADMIN — STANDARDIZED SENNA CONCENTRATE AND DOCUSATE SODIUM 1 TABLET: 8.6; 5 TABLET, FILM COATED ORAL at 22:22

## 2018-05-07 NOTE — H&P
Date of Surgery Update:  Jose Ochoa was seen and examined. History and physical has been reviewed. The patient has been examined.  There have been no significant clinical changes since the completion of the originally dated History and Physical.    Signed By: Dolores Rivas MD     May 7, 2018 2:15 PM

## 2018-05-07 NOTE — IP AVS SNAPSHOT
[address] [facility] 
 
 
 [address] [facility] 
[phone] Patient: Rakel Cano MRN: [id number] About your hospitalization You were admitted on: May 7, 2018 You last received care in the:  [facility] 4M POST SURG ORT 1 You were discharged on:  May 11, 2018 Why you were hospitalized Your primary diagnosis was:  Not on File Your diagnoses also included:  Lumbar Stenosis With Neurogenic Claudication Follow-up Information Follow up With Details Comments Contact Info  [place] [address] [facility] [address] 
[phone] BREANA Pryor In 3 weeks As previously scheduled [address] [facility] [address] [facility] 
[phone] Danya Campos MD   [address]  [address] [address] 
[phone] Discharge Orders None A check shayan indicates which time of day the medication should be taken. My Medications START taking these medications Instructions Each Dose to Equal  
 Morning Noon Evening Bedtime  
 cyclobenzaprine 10 mg tablet Commonly known as:  FLEXERIL Notes to Patient:  Not given this admission Take 1 Tab by mouth three (3) times daily as needed for Muscle Spasm(s). 10 mg HYDROmorphone 2 mg tablet Commonly known as:  DILAUDID Your next dose is:  9pm  
   
 Take 1-2 tablets by mouth every 4-6 hours as needed for pain  
     
   
   
   
  
 naloxone 4 mg/actuation nasal spray Commonly known as:  [employer] Notes to Patient:  Not given this admission 1 [place] by IntraNASal route as needed. Apply 1 nasal spray to one nostril; may repeat every 2 to 3 minutes in alternating nostrils until medical assistance becomes available  Indications: OPIATE-INDUCED RESPIRATORY DEPRESSION, Opioid Toxicity 1 Spray  
    
   
   
   
  
 promethazine 25 mg tablet Commonly known as:  PHENERGAN Notes to Patient:  Not given this admission Take 1 Tab by mouth every six (6) hours as needed for Nausea. 25 mg  
    
   
   
   
  
 senna-docusate 8.6-50 mg per tablet Commonly known as:  Shukri Oliveros Take 1 Tab by mouth two (2) times a day. 1 Tab CONTINUE taking these medications Instructions Each Dose to Equal  
 Morning Noon Evening Bedtime ATORVASTATIN PO Your next dose is:  9pm  
   
 Take 10 mg by mouth nightly. 10 mg  
    
   
   
   
  
 clonazePAM 2 mg tablet Commonly known as:  Coby Smithimer Your next dose is:  9pm  
   
 Take 2 mg by mouth nightly. 2 mg  
    
   
   
   
  
 fluticasone 50 mcg/actuation nasal spray Commonly known as:  Kristin Lara Your next dose is:  9pm  
   
 1 [place] by Both Nostrils route nightly. Indications: Allergic Rhinitis 1 Spray  
    
   
   
   
  
 levothyroxine 50 mcg tablet Commonly known as:  SYNTHROID Your next dose is:  6am  
   
 Take 50 mcg by mouth every morning. 50 mcg  
    
   
   
   
  
 lutein 6 mg Tab Notes to Patient:  Not given this admission Take 1 Tab by mouth nightly. Indications: eye health 1 Tab TYLENOL SINUS PO Notes to Patient:  Not given this admission Take 1 Tab by mouth two (2) times a day. 1 Tab STOP taking these medications ADULT MULTIVITAMIN GUMMIES 200 mcg Chew Generic drug:  multivit with min-folic acid  
   
  
 aspirin 81 mg chewable tablet  
   
  
 naproxen sodium 220 mg Cap Where to Get Your Medications Information on where to get these meds will be given to you by the nurse or doctor. ! Ask your nurse or doctor about these medications  
  cyclobenzaprine 10 mg tablet HYDROmorphone 2 mg tablet  
 naloxone 4 mg/actuation nasal spray  
 promethazine 25 mg tablet  
 senna-docusate 8.6-50 mg per tablet Opioid Education Prescription Opioids: What You Need to Know: 
 
Prescription opioids can be used to help relieve moderate-to-severe pain and are often prescribed following a surgery or injury, or for certain health conditions. These medications can be an important part of treatment but also come with serious risks. Opioids are strong pain medicines. Examples include hydrocodone, oxycodone, fentanyl, and morphine. Heroin is an example of an illegal opioid. It is important to work with your health care provider to make sure you are getting the safest, most effective care. WHAT ARE THE RISKS AND SIDE EFFECTS OF OPIOID USE? Prescription opioids carry serious risks of addiction and overdose, especially with prolonged use. An opioid overdose, often marked by slow breathing, can cause sudden death. The use of prescription opioids can have a number of side effects as well, even when taken as directed. · Tolerance-meaning you might need to take more of a medication for the same pain relief · Physical dependence-meaning you have symptoms of withdrawal when the medication is stopped. Withdrawal symptoms can include nausea, sweating, chills, diarrhea, stomach cramps, and muscle aches. Withdrawal can last up to several weeks, depending on which drug you took and how long you took it. · Increased sensitivity to pain · Constipation · Nausea, vomiting, and dry mouth · Sleepiness and dizziness · Confusion · Depression · Low levels of testosterone that can result in lower sex drive, energy, and strength · Itching and sweating RISKS ARE GREATER WITH:      
· History of drug misuse, substance use disorder, or overdose · Mental health conditions (such as depression or anxiety) · Sleep apnea · Older age (72 years or older) · Pregnancy Avoid alcohol while taking prescription opioids. Also, unless specifically advised by your health care provider, medications to avoid include: · Benzodiazepines (such as Xanax or Valium) · Muscle relaxants (such as Soma or Flexeril) · Hypnotics (such as Ambien or Lunesta) · Other prescription opioids KNOW YOUR OPTIONS Talk to your health care provider about ways to manage your pain that don't involve prescription opioids. Some of these options may actually work better and have fewer risks and side effects. Options may include: 
· Pain relievers such as acetaminophen, ibuprofen, and naproxen · Some medications that are also used for depression or seizures · Physical therapy and exercise · Counseling to help patients learn how to cope better with triggers of pain and stress. · Application of heat or cold compress · Massage therapy · Relaxation techniques Be Informed Make sure you know the name of your medication, how much and how often to take it, and its potential risks & side effects. IF YOU ARE PRESCRIBED OPIOIDS FOR PAIN: 
· Never take opioids in greater amounts or more often than prescribed. Remember the goal is not to be pain-free but to manage your pain at a tolerable level. · Follow up with your primary care provider to: · Work together to create a plan on how to manage your pain. · Talk about ways to help manage your pain that don't involve prescription opioids. · Talk about any and all concerns and side effects. · Help prevent misuse and abuse. · Never sell or share prescription opioids · Help prevent misuse and abuse. · Store prescription opioids in a secure place and out of reach of others (this may include visitors, children, friends, and family). · Safely dispose of unused/unwanted prescription opioids: Find your community drug take-back program or your pharmacy mail-back program, or flush them down the toilet, following guidance from the Food and Drug Administration (www.fda.gov/Drugs/ResourcesForYou). · Visit www.cdc.gov/drugoverdose to learn about the risks of opioid abuse and overdose. · If you believe you may be struggling with addiction, tell your health care provider and ask for guidance or call [address] at [phone]. Discharge Instructions Lumbar Spinal Surgery Discharge Instructions Dr. Sofie Becerra [address] [facility] [phone] Activity:   
? You are going home a well person, be as active as possible. Your only exercise should be walking. Start with short frequent walks and increase your walking distance each day. Start with walking twice a day for 5 minutes and increase your distance each day 2-3 minutes until you reach 25 minutes twice a day. Limit the amount of time you sit to 20-30 minute intervals. Sitting for prolonged periods of time will be uncomfortable for you following your surgery. ? No bending, lifting (of 5lbs or more), twisting, or straining. ? Do not drive until your doctor states you may do so. However, you may ride in a car for short distances. ? If you are required to wear a brace, you should wear it at all times when you are out of bed. You may remove it when sleeping unless your physician advises you against it. ? When you are in the bed, you may lay on your back or on either side. Do not lie on your stomach. ? You may resume sexual relations 3-4 weeks after surgery depending on how you are feeling. ? Continue to use your incentive spirometer for deep breathing exercises. Driving: ? You may not drive or return to work until instructed by your physician. However, you may ride in the car for short periods of time. Brace: ? If you have a back brace, you should wear your brace at all times when you are out of bed. Do not wear the brace while in bed or showering. ? Remember to always wear a cotton t-shirt underneath your brace. ? Do not bend or twist when your brace is off.  
 
Diet: 
 ? You may resume your regular home diet as tolerated. If your throat is sore, you should eat soft foods for the first couple of days. ? Be sure to drink plenty of fluids; it is important to keep yourself hydrated. ? Avoid alcoholic beverages and ABSOLUTELY NO tobacco products. Tobacco products will interfere with your healing. If you continue to use tobacco, you may end up needing another surgery in the future. Dressing: You have on a Prineo dressing. This is a waterproof bacteriostatic dressing that requires no post-operative care. Please do not peel the dressing off, or apply any oil based products, as they may expedite the deterioration of the mesh. The dressing will slowly chip off on its own. 
? Do not rub or apply lotion or ointments to incision site. Shower: ? You may shower 5 days after your surgery. ? You may remove your brace during showers. ? NO not use tub baths, swimming pools or Jacuzzis. Medications: 
? Check with your physician before taking any anti-inflammatory medications. (Advil, Aleve, Ibuprofen, Aspirin) ? Take your pain medication as directed ? Do NOT take additional Tylenol if your prescribed pain medication has acetaminophen in it (Endocet/Percocet, Lortab, Norco) ? It is important to have regular bowel movements. Pain medications can be constipating. Stool softeners, warm prune juice, increasing your water intake, and increasing fiber in your diet can help in preventing constipation. ? Do NOT take laxatives if at all possible except in severe situations. It can result in a vicious cycle of constipation and diarrhea. Stockings: ? You have been given T.E.D. stockings to wear. Continue to wear these for 7 days after your discharge. Put them on in the morning and take them off at night. They are used to increase your circulation and prevent blood clots from forming in your legs. Follow-Up ? Please call ASAP to schedule your 1st post-operative appointment. This should be approximately 3 weeks from your surgery date. Notify your physician in you develop any of the following conditions: 
? Fever above 101 degrees for 24 hours. ? Nausea or vomiting. ? Severe headache. 
? Inability to urinate ? Loss of bowel or bladder function (sudden onset of incontinence) ? Changes in sensation in your extremities (numbness, tingling, loss of color). ? Severe pain or pain not relieved by medications. ? Redness, swelling, or drainage from your incision. ? Persistent pain in the chest.  
? Pain in the calf of either leg. 
? Increased weakness (if this is greater than before your surgery). If you have any questions about your dressing contact your Orthopaedic Surgeons office. OFFICE OF DR. Betsy Hernandez   [phone] OUR [place] [address] [address], [address], [address] [facility], [address] [facility] YOU CAN RE-START TAKING YOUR DAILY 81 mg ASPIRIN AND MULTI-VITAMEN WHEN YOU ARE 1 WEEK OUT FROM SURGERY 
 
** IMPORTANT: UNDER YOUR HONEYCOMB DRESSING, YOU HAVE A PRINEO ADHESIVE MESH DIRECTLY OVER YOUR INCISION. THIS MESH WAS STERILELY GLUED TO YOUR SKIN DURING SURGERY. DO NOT REMOVE THIS. NO ONE ELSE SHOULD REMOVE IT EITHER. IT WILL COME OFF ON ITS OWN OVER TIME 
 
YOUR HONEYCOMB DRESSING NEEDS TO BE REMOVED PRIOR TO SHOWERING. THEN THE PRINEO MESH CAN BE LEFT OPEN TO AIR * WEAR YOUR BRACE AS ADVISED * NO DRIVING UNTIL YOU ARE CLEARED TO DO SO BY YOUR SURGEON 
 
* LIMIT LIFTING, BENDING AND TWISTING. NO LIFTING MORE THAN 5 LBS * MAKE SURE YOU ARE GETTING GOOD NUTRITION (Lean Protein, Vitamin D AND Calcium) * DO NOT TAKE ANY NSAIDs FOR THE FIRST 3 MONTHS AFTER SURGERY (such as Advil/Ibuprofen/Motrin, Aleve/Naproxen/Naprosyn, Diclofenac, Celebrex, Meloxicam, Indomethacin, Goody's powder, BC powder etc.) * NO NICOTINE PRODUCTS * FULLY READ YOUR DISCHARGE INSTRUCTIONS Introducing [facility]! Graham Albarran introduces "[employer] patient portal. Now you can access parts of your medical record, email your doctor's office, and request medication refills online. 1. In your internet browser, go to [email or web address] 2. Click on the First Time User? Click Here link in the Sign In box. You will see the New Member Sign Up page. 3. Enter your "[employer] Access Code exactly as it appears below. You will not need to use this code after youve completed the sign-up process. If you do not sign up before the expiration date, you must request a new code. · "[employer] Access Code: [email or web address] Expires: 6/27/2018  4:39 PM 
 
4. Enter the last four digits of your Social Security Number (xxxx) and Date of Birth (mm/dd/yyyy) as indicated and click Submit. You will be taken to the next sign-up page. 5. Create a "[employer] ID. This will be your "[employer] login ID and cannot be changed, so think of one that is secure and easy to remember. 6. Create a "[employer] password. You can change your password at any time. 7. Enter your Password Reset Question and Answer. This can be used at a later time if you forget your password. 8. Enter your e-mail address. You will receive e-mail notification when new information is available in [address] 9. Click Sign Up. You can now view and download portions of your medical record. 10. Click the Download Summary menu link to download a portable copy of your medical information. If you have questions, please visit the Frequently Asked Questions section of the "[employer] website. Remember, "[employer] is NOT to be used for urgent needs. For medical emergencies, dial 911. Now available from your iPhone and Android! Introducing Dave Callahan As a Graham Albarran patient, I wanted to make you aware of our electronic visit tool called Dave Callahan. [facility] Albarran 24/7 allows you to connect within minutes with a medical provider 24 hours a day, seven days a week via a mobile device or tablet or logging into a secure website from your computer. You can access Talkbits from anywhere in the [place]. A virtual visit might be right for you when you have a simple condition and feel like you just dont want to get out of bed, or cant get away from work for an appointment, when your regular [facility] provider is not available (evenings, weekends or holidays), or when youre out of town and need minor care. Electronic visits cost only $49 and if the [facility] 24/7 provider determines a prescription is needed to treat your condition, one can be electronically transmitted to a nearby pharmacy*. Please take a moment to enroll today if you have not already done so. The enrollment process is free and takes just a few minutes. To enroll, please download the [facility] 24/7 cesar to your tablet or phone, or visit [email or web address] to enroll on your computer. And, as an [address] [place] [address] patient with a [employer] account, the results of your visits will be scanned into your electronic medical record and your primary care provider will be able to view the scanned results. We urge you to continue to see your regular [facility] provider for your ongoing medical care. And while your primary care provider may not be the one available when you seek a Dave White Castlenolberto virtual visit, the peace of mind you get from getting a real diagnosis real time can be priceless. For more information on Vig[employer]reshma[employer], view our Frequently Asked Questions (FAQs) at [email or web address]. Sincerely, 
 
iMrna Rahman MD 
Chief Medical Officer Everette Ferguson *:  certain medications cannot be prescribed via Dave White Castlenolberto Providers Seen During Your Hospitalization Provider Specialty Primary office phone Seamus Mcgarry MD Orthopedic Surgery [phone] Your Primary Care Physician (PCP) Primary Care Physician Office Phone Office Fax Jeison Devine [address] [phone] You are allergic to the following No active allergies Recent Documentation Weight BMI OB Status Smoking Status 67.9 kg 25.69 kg/m2 Hysterectomy Never Smoker Emergency Contacts Name Discharge Info Relation Home Work Mobile Herberth Castañeda DISCHARGE CAREGIVER [3] Spouse [3] [phone] Patient Belongings The following personal items are in your possession at time of discharge: 
  Dental Appliances: None  Visual Aid: With patient, Glasses      Home Medications: None   Jewelry: None  Clothing: Other (comment) (clothes to PACU)    Other Valuables: None Discharge Instructions Attachments/References NALOXONE (INTO THE NOSE) (ENGLISH) OPIOID OVERDOSE: NALOXONE: GENERAL INFO (ENGLISH) Patient Handouts Naloxone (Into the nose) Naloxone (nal-OX-one) Treats opioid overdose in an emergency situation. Must be given as soon as possible. Brand Name(s): Narcan There may be other brand names for this medicine. When This Medicine Should Not Be Used: This medicine is not right for everyone. Do not use it if you had an allergic reaction to naloxone. How to Use This Medicine:  
Spray · Your doctor will tell you how much medicine to use. Do not use more than directed. · This medicine must be given to you (the patient) by someone else. Talk with people close to you so they know what to do in case of an emergency. · Read and follow the patient instructions that come with this medicine. Talk to your doctor or pharmacist if you have any questions. · This medicine is for use only in the nose. Do not get any of it in your eyes or on your skin. If it does get on these areas, rinse it off right away. · To use:  
¨ Each nasal spray contains only one dose of naloxone. Do not prime or test the nasal spray. ¨ Hold the nasal spray with your thumb on the bottom of the plunger and your first and middle fingers on either side of the nozzle. ¨ Lay the patient on his or her back. Support the patient's neck with your hand and let the head tilt back. ¨ Gently insert the tip of the nozzle into one nostril, until your fingers on either side of the nozzle are against the bottom of the patient's nose. ¨ Press the plunger firmly to give the dose. Remove the nasal spray from the patient's nose. ¨ Move the patient on his or her side (recovery position). Get emergency medical help right away. ¨ Watch the patient closely. If needed, you may give more doses every 2 to 3 minutes until the patient responds. Use a new nasal spray for each dose and spray the medicine into the other nostril each time. · Store the medicine in a closed container at room temperature, away from heat, moisture, and direct light. Do not freeze. · Ask your pharmacist about the best way to dispose of medicine that you do not use. Drugs and Foods to Avoid: Ask your doctor or pharmacist before using any other medicine, including over-the-counter medicines, vitamins, and herbal products. Warnings While Using This Medicine: · Tell your doctor if you are pregnant or breastfeeding, or if you have heart or blood vessel disease. · This medicine should be given right away after a suspected or known overdose of an opioid (narcotic) medicine. This will help prevent serious breathing problems and severe sleepiness that can lead to death. · The effects of the opioid medicine may last longer than the effects of the naloxone. This means the breathing problems and sleepiness could come back. Always call for emergency help after the first dose of naloxone. · This medicine could cause withdrawal symptoms from the opioid medicine. · Keep all medicine out of the reach of children. Never share your medicine with anyone. Possible Side Effects While Using This Medicine:  
Call your doctor right away if you notice any of these side effects: · Allergic reaction: Itching or hives, swelling in your face or hands, swelling or tingling in your mouth or throat, chest tightness, trouble breathing · Crying more than the usual (in babies) · Diarrhea, nausea, vomiting, stomach cramps · Fast, pounding, or uneven heartbeat · Fever, runny nose, sneezing, sweating, yawning · Ongoing trouble breathing · Seizure, shaking, or feeling restless, nervous, or irritable If you notice these less serious side effects, talk with your doctor:  
· Headache · Joint or muscle pain If you notice other side effects that you think are caused by this medicine, tell your doctor. Call your doctor for medical advice about side effects. You may report side effects to FDA at 3-459-FDA-6200 © 2017 [address] Information is for End User's use only and may not be sold, redistributed or otherwise used for commercial purposes. The above information is an  only. It is not intended as medical advice for individual conditions or treatments. Talk to your doctor, nurse or pharmacist before following any medical regimen to see if it is safe and effective for you. Learning About Naloxone for Opioid Overdose What is naloxone? Opioids are strong pain medicines. Examples include hydrocodone, oxycodone, fentanyl, and morphine. Heroin is an example of an illegal opioid. Taking too much of an opioid can cause death. An overdose is an emergency. Naloxone is a medicine that reverses the effects of an overdose. If you take it soon enough after an overdose, it can save your life. Naloxone comes in a rescue kit you can carry with you. You may hear it called a Narcan kit for short. The rescue kit may contain: · The medicine. · Syringes and needles. · A nasal adapter for the syringes. · A separate nasal spray device. Your doctor can give you a prescription for a rescue kit and show you how to use it. In some places you can buy Narcan kits without a prescription. When is naloxone used? Naloxone is used when a person shows signs of an opioid overdose. A person may have overdosed if he or she is: · Sleepy or hard to wake up. · Confused. · Not breathing normally. Make sure your family and friends know about these signs of an overdose. If someone appears to have overdosed, call 911. A drug overdose is an emergency. How do you use it? If you overdose, you may not be able to give yourself the medicine. So it's very important to teach friends and family how and when to give it to you. Rescue kits come with instructions. There are two ways to give the medicine. Many rescue kits can be used for either method. The methods are: · Injection with needle and syringe. ¨ Training may be needed in order to use this method correctly. ¨ Some rescue kits come with automatic injectors that don't require special training. ¨ The medicine can be injected through clothing. · Nasal spray. ¨ Many rescue kits come with a nasal adapter. It attaches to the syringe and turns the medicine into a mist. 
¨ The mist is sprayed into the nose of a person who has overdosed. The person needs to be lying down when the mist is sprayed. Overdose symptoms may return a few minutes after the first dose from the rescue kit. If that happens, a second dose is needed. Rescue kits come with two doses for that reason. Keep your rescue kit with you always. You never know when you might need it. If you think you or someone else may have overdosed but you're not sure, use the kit anyway. Aside from going through withdrawal, which may be uncomfortable, there is no downside to using this medicine. Always go to the emergency room after using naloxone. Doctors will want to make sure the overdose has been reversed. Follow-up care is a key part of your treatment and safety. Be sure to make and go to all appointments, and call your doctor if you are having problems. It's also a good idea to know your test results and keep a list of the medicines you take. Where can you learn more? Go to http://asndi-cierra.info/. Enter O177 in the search box to learn more about \"Learning About Naloxone for Opioid Overdose. \" Current as of: November 3, 2016 Content Version: 11.4 © 2579-4196 [employer]. Care instructions adapted under license by [employer] (which disclaims liability or warranty for this information). If you have questions about a medical condition or this instruction, always ask your healthcare professional. [address] any warranty or liability for your use of this information. Please provide this summary of care documentation to your next provider. Signatures-by signing, you are acknowledging that this After Visit Summary has been reviewed with you and you have received a copy. Patient Signature:  ____________________________________________________________ Date:  ____________________________________________________________  
  
[facility] Provider Signature:  ____________________________________________________________ Date:  ____________________________________________________________

## 2018-05-07 NOTE — PERIOP NOTES
Patient was intubated in the supine position on stretcher, then transferred to OR table with assistance and place in the prone position. Final position approved by Dr. Belinda Stevens. All lines are padded and secured.

## 2018-05-07 NOTE — ANESTHESIA PREPROCEDURE EVALUATION
Anesthetic History   No history of anesthetic complications            Review of Systems / Medical History  Patient summary reviewed, nursing notes reviewed and pertinent labs reviewed    Pulmonary  Within defined limits                 Neuro/Psych         Headaches     Cardiovascular              Hyperlipidemia    Exercise tolerance: >4 METS  Comments: Not on beta blocker   GI/Hepatic/Renal  Within defined limits              Endo/Other      Hypothyroidism       Other Findings   Comments: Colitis, ischemic  2017     HX CERVICAL FUSION    Lumbar stenosis with neurogenic claudication         Physical Exam    Airway  Mallampati: I  TM Distance: 4 - 6 cm  Neck ROM: normal range of motion   Mouth opening: Normal     Cardiovascular  Regular rate and rhythm,  S1 and S2 normal,  no murmur, click, rub, or gallop  Rhythm: regular  Rate: normal         Dental  No notable dental hx       Pulmonary  Breath sounds clear to auscultation               Abdominal  GI exam deferred       Other Findings            Anesthetic Plan    ASA: 2  Anesthesia type: general          Induction: Intravenous  Anesthetic plan and risks discussed with: Patient

## 2018-05-07 NOTE — OP NOTES
[address], [address] [place] [address]    OPERATIVE REPORT      NAME: Parag Frazier    AGE: 81 y.o. YOB: 1945    MEDICAL RECORD NUMBER: [id number]    DATE OF SURGERY: 5/7/2018    PREOPERATIVE DIAGNOSES:  1. Lumbar stenosis  2. Acquired lumbar spondylolisthesis    POSTOPERATIVE DIAGNOSES:  1. Lumbar stenosis   2. Acquired lumbar spondylolisthesis     OPERATIVE PROCEDURES:   1. Laminectomy, partial facetectomy, and foraminotomy of L2, L3, L4, L5.  2. Posterolateral fusion and posterior lumbar interbody fusion of L3-L4. 3. Posterolateral fusion and posterior lumbar interbody fusion of L4-L5. 6. Pedicle screw instrumentation with [place] pedicle screws for L3, L4, and L5 bilaterally. 7. Application of biomechanical interbody device at L4-L5 and L3-L4. 8. Morselized allograft for spine surgery and local autograft for spine surgery stem cells. SURGEON: Sebastien Bustamante MD     ASSISTANT: Elsie Mojica     ANESTHESIA: General    COMPLICATIONS: None apparent     NEUROMONITORING: We used SSEPs and spontaneous EMGs. We also stimulated the pedicle screws. ESTIMATED BLOOD LOSS: 200    INDICATION FOR PROCEDURE: The patient is a very pleasant 81 y.o. female with debilitating back pain and leg pain. She failed conservative measures. She elected to proceed with operative intervention. She was aware of the risks, benefits, and alternatives. She provided informed consent. PROCEDURE: The patient was identified in the preoperative holding area. Her lumbar spine was marked by me. She was transferred to the operating room where general anesthesia was given. She was also given perioperative antibiotics. [facility]. All bony prominences were well padded. The lumbar spine was prepped and draped in the usual standard fashion. We performed a surgical timeout. I made a skin incision in the midline.  I exposed the posterior lumbar spine. I took intraoperative fluoroscopy to verify our levels. I exposed the transverse processes of L3, L4, and L5 bilaterally. I then began my decompression by performing a laminectomy of L2, L3, L4, and L5. I also performed a bilateral partial facetectomy and foraminotomy to decompress the thecal sac and the roots of L2, L3, L4, and L5. I performed a transforaminal approach to L4-L5 on the left side by decompressing the stenosis found within the foramen and lateral to the foramen on the left side for L4-L5. I performed an identical transforaminal approach to L3-L4 on the left side. I decompressed the stenosis found within the foramen and lateral to the foramen on the left side for L3-L4. I then performed a standard diskectomy at L4-L5 and L3-L4. I prepared the endplates to bleeding bone. I performed trial sizing. I placed the appropriately sized biomechanical device into L4-L5 and into L3-L4 with the appropriate amount of tension and alignment. The biomechanical devices were packed with local autograft. I then cannulated our pedicles for L3, L4, and L5 bilaterally in standard fashion. I probed each pedicle. I copiously irrigated the entire wound. I decorticated our fusion beds bilaterally with a high speed bur. I placed our bone graft into our fusion beds. I then placed pedicle screws for L3, L4, and L5 bilaterally in standard fashion under fluoroscopic guidance. I had good purchase for each pedicle screw. I stimulated all the screws with pedicle screw stimulation. The pedicle screws were found to be within the appropriate range of amplitude. I then placed contoured rods on top of the pedicle screws bilaterally. The rods were locked to the screws according to the 's specification. We had good hemostasis. There was no CSF leaking. The fusion beds were packed with morselized allograft and local autograft. The exposed neurologic elements were protected with Duragen.  A deep drain was placed. The wound was closed in several layers. A sterile dressing was applied. The patient was extubated and transferred to the recovery room in good medical condition. I, Dr. Guilherme Nguyễn, performed the above procedure.      Guilherme Nguyễn MD  5/7/2018

## 2018-05-08 LAB
ANION GAP SERPL CALC-SCNC: 10 MMOL/L (ref 5–15)
BUN SERPL-MCNC: 22 MG/DL (ref 6–20)
BUN/CREAT SERPL: 23 (ref 12–20)
CALCIUM SERPL-MCNC: 7.9 MG/DL (ref 8.5–10.1)
CHLORIDE SERPL-SCNC: 106 MMOL/L (ref 97–108)
CO2 SERPL-SCNC: 24 MMOL/L (ref 21–32)
CREAT SERPL-MCNC: 0.94 MG/DL (ref 0.55–1.02)
GLUCOSE SERPL-MCNC: 142 MG/DL (ref 65–100)
HGB BLD-MCNC: 10.5 G/DL (ref 11.5–16)
POTASSIUM SERPL-SCNC: 4.5 MMOL/L (ref 3.5–5.1)
SODIUM SERPL-SCNC: 140 MMOL/L (ref 136–145)

## 2018-05-08 PROCEDURE — 80048 BASIC METABOLIC PNL TOTAL CA: CPT | Performed by: ORTHOPAEDIC SURGERY

## 2018-05-08 PROCEDURE — 77030020782 HC GWN BAIR PAWS FLX 3M -B

## 2018-05-08 PROCEDURE — 36415 COLL VENOUS BLD VENIPUNCTURE: CPT | Performed by: ORTHOPAEDIC SURGERY

## 2018-05-08 PROCEDURE — 65270000029 HC RM PRIVATE

## 2018-05-08 PROCEDURE — 97116 GAIT TRAINING THERAPY: CPT

## 2018-05-08 PROCEDURE — 77010033678 HC OXYGEN DAILY

## 2018-05-08 PROCEDURE — 97530 THERAPEUTIC ACTIVITIES: CPT

## 2018-05-08 PROCEDURE — 97165 OT EVAL LOW COMPLEX 30 MIN: CPT

## 2018-05-08 PROCEDURE — 51798 US URINE CAPACITY MEASURE: CPT

## 2018-05-08 PROCEDURE — 74011250636 HC RX REV CODE- 250/636: Performed by: ORTHOPAEDIC SURGERY

## 2018-05-08 PROCEDURE — 85018 HEMOGLOBIN: CPT | Performed by: ORTHOPAEDIC SURGERY

## 2018-05-08 PROCEDURE — 74011250637 HC RX REV CODE- 250/637: Performed by: ORTHOPAEDIC SURGERY

## 2018-05-08 PROCEDURE — 97535 SELF CARE MNGMENT TRAINING: CPT

## 2018-05-08 PROCEDURE — 97161 PT EVAL LOW COMPLEX 20 MIN: CPT

## 2018-05-08 PROCEDURE — L0627 LO SAG RI AN/POS PNL PRE CST: HCPCS

## 2018-05-08 RX ADMIN — Medication 10 ML: at 15:03

## 2018-05-08 RX ADMIN — STANDARDIZED SENNA CONCENTRATE AND DOCUSATE SODIUM 1 TABLET: 8.6; 5 TABLET, FILM COATED ORAL at 18:11

## 2018-05-08 RX ADMIN — FLUTICASONE PROPIONATE 1 SPRAY: 50 SPRAY, METERED NASAL at 21:15

## 2018-05-08 RX ADMIN — FAMOTIDINE 20 MG: 20 TABLET ORAL at 09:09

## 2018-05-08 RX ADMIN — LEVOTHYROXINE SODIUM 50 MCG: 50 TABLET ORAL at 05:39

## 2018-05-08 RX ADMIN — POLYETHYLENE GLYCOL (3350) 17 G: 17 POWDER, FOR SOLUTION ORAL at 09:09

## 2018-05-08 RX ADMIN — FAMOTIDINE 20 MG: 20 TABLET ORAL at 18:11

## 2018-05-08 RX ADMIN — Medication 10 ML: at 21:16

## 2018-05-08 RX ADMIN — ATORVASTATIN CALCIUM 10 MG: 10 TABLET, FILM COATED ORAL at 21:16

## 2018-05-08 RX ADMIN — Medication 2 G: at 16:34

## 2018-05-08 RX ADMIN — OXYCODONE HYDROCHLORIDE 10 MG: 5 TABLET ORAL at 12:20

## 2018-05-08 RX ADMIN — Medication 2 G: at 00:11

## 2018-05-08 RX ADMIN — OXYCODONE HYDROCHLORIDE 10 MG: 5 TABLET ORAL at 19:16

## 2018-05-08 RX ADMIN — Medication 2 G: at 09:09

## 2018-05-08 RX ADMIN — HYDROMORPHONE HYDROCHLORIDE 0.5 MG: 2 INJECTION INTRAMUSCULAR; INTRAVENOUS; SUBCUTANEOUS at 21:16

## 2018-05-08 RX ADMIN — OXYCODONE HYDROCHLORIDE 10 MG: 5 TABLET ORAL at 09:09

## 2018-05-08 RX ADMIN — CLONAZEPAM 2 MG: 1 TABLET ORAL at 21:16

## 2018-05-08 RX ADMIN — OXYCODONE HYDROCHLORIDE 10 MG: 5 TABLET ORAL at 23:38

## 2018-05-08 RX ADMIN — SODIUM CHLORIDE 125 ML/HR: 900 INJECTION, SOLUTION INTRAVENOUS at 18:09

## 2018-05-08 RX ADMIN — STANDARDIZED SENNA CONCENTRATE AND DOCUSATE SODIUM 1 TABLET: 8.6; 5 TABLET, FILM COATED ORAL at 09:09

## 2018-05-08 NOTE — PROGRESS NOTES
5/8/2018     2:55 PM  CM received acceptance from At [address] [place] [address] for [facility] services. No additional CT service needs indicated at this time. 12:17 PM    Reason for Admission:   Elective - Lumbar stenosis                   RRAT Score:          10           Plan for utilizing home health:      Pt Choice Signed for At Home Care                    Likelihood of Readmission:  green                         Transition of Care Plan:                 CM met with pt for assessment. Demographics and PCP were confirmed. Pt is a 81year old,  female who lives in a private residence with her  (6 interior steps, 0 interior steps she will need to climb). PTA, pt was able to complete ADLs without the use of DME. Pt has crutches to assist during recovery. Pt has prescription drug coverage, and prefers [facility] on [address]. Pt has no prior hh, rehab or SNF. Pt prefers [facility] [address], and signed Pt Choice Letter (see attachments). CM completed referral and is awaiting response. Franco Hector MA    Care Management Interventions  PCP Verified by CM: Yes Geovani Manuelito)  Palliative Care Criteria Met (RRAT>21 & CHF Dx)?: No  Mode of Transport at Discharge:  Other (see comment) ()  Transition of Care Consult (CM Consult): [address]: No  Reason Outside [place]: Physician referred to specific agency ([facility] [address])  [address]: No  Physical Therapy Consult: Yes  Occupational Therapy Consult: Yes  Speech Therapy Consult: No  Current Support Network: Lives with Spouse  Confirm Follow Up Transport: Family  Plan discussed with Pt/Family/Caregiver: Yes  Freedom of Choice Offered: Yes  Discharge Location  Discharge Placement: Home with home health

## 2018-05-08 NOTE — PROGRESS NOTES
Problem: Pressure Injury - Risk of  Goal: *Prevention of pressure injury  Document Luis Scale and appropriate interventions in the flowsheet. Outcome: Progressing Towards Goal  Pressure Injury Interventions:  Sensory Interventions: Assess changes in LOC, Check visual cues for pain, Float heels, Minimize linen layers, Pressure redistribution bed/mattress (bed type), Turn and reposition approx.  every two hours (pillows and wedges if needed)         Activity Interventions: Increase time out of bed, Pressure redistribution bed/mattress(bed type), PT/OT evaluation    Mobility Interventions: Float heels, HOB 30 degrees or less, Pressure redistribution bed/mattress (bed type), PT/OT evaluation    Nutrition Interventions: Document food/fluid/supplement intake, Offer support with meals,snacks and hydration

## 2018-05-08 NOTE — ANESTHESIA POSTPROCEDURE EVALUATION
Post-Anesthesia Evaluation and Assessment    Patient: Vasyl Swain MRN: [id number]  SSN: [id number]    YOB: 1945  Age: 81 y.o. Sex: female       Cardiovascular Function/Vital Signs  Visit Vitals    /59    Pulse (!) 102    Temp 36.4 °C (97.5 °F)    Resp 12    SpO2 97%       Patient is status post general anesthesia for Procedure(s):  L2-L5 LAMINECTOMY, L3-L5 FUSION, INSTRUMENTATION, TLIF, BONE GRAFT. Nausea/Vomiting: None    Postoperative hydration reviewed and adequate. Pain:  Pain Scale 1: Numeric (0 - 10) (05/07/18 2030)  Pain Intensity 1: 0 (05/07/18 2030)   Managed    Neurological Status:   Neuro (WDL): Exceptions to WDL (05/07/18 2030)  Neuro  Neurologic State: Drowsy (05/07/18 2030)  Orientation Level: Appropriate for age (05/07/18 2030)  Cognition: Appropriate for age attention/concentration; Follows commands (05/07/18 2030)  Speech: Clear (05/07/18 2030)  LUE Motor Response: Purposeful (05/07/18 2030)  LLE Motor Response: Purposeful;Weak (05/07/18 2030)  RUE Motor Response: Purposeful (05/07/18 2030)  RLE Motor Response: Purposeful;Weak (05/07/18 2030)   At baseline    Mental Status and Level of Consciousness: Arousable    Pulmonary Status:   O2 Device: Nasal cannula (05/07/18 2030)   Adequate oxygenation and airway patent    Complications related to anesthesia: None    Post-anesthesia assessment completed.  No concerns    Signed By: Layla Santos MD     May 7, 2018

## 2018-05-08 NOTE — PROGRESS NOTES
Problem: Falls - Risk of  Goal: *Absence of Falls  Document Nani Fall Risk and appropriate interventions in the flowsheet.    Outcome: Progressing Towards Goal  Fall Risk Interventions:  Mobility Interventions: Bed/chair exit alarm, Communicate number of staff needed for ambulation/transfer, Patient to call before getting OOB, Utilize walker, cane, or other assitive device         Medication Interventions: Bed/chair exit alarm, Patient to call before getting OOB, Teach patient to arise slowly, Utilize gait belt for transfers/ambulation    Elimination Interventions: Bed/chair exit alarm, Call light in reach, Patient to call for help with toileting needs, Toileting schedule/hourly rounds

## 2018-05-08 NOTE — PROGRESS NOTES
Primary Nurse Jacquelyn Harris and Jose Khan RN performed a dual skin assessment on this patient Impairment noted- see wound doc flow sheet. Surgical incision mid back with drain site. No pressure ulcers noted on heels, elbows, or sacrum.   Luis score is 17

## 2018-05-08 NOTE — PROGRESS NOTES
Bedside and Verbal shift change report given to Olena John RN (oncoming nurse) by Yessenia Garcia (offgoing nurse). Report included the following information SBAR, Kardex, Procedure Summary, Intake/Output, MAR, Accordion and Recent Results.

## 2018-05-08 NOTE — PROGRESS NOTES
Problem: Mobility Impaired (Adult and Pediatric)  Goal: *Acute Goals and Plan of Care (Insert Text)  Physical Therapy Goals  Initiated 5/8/2018    1. Patient will move from supine to sit and sit to supine , scoot up and down and roll side to side in bed with independence within 4 days. 2. Patient will perform sit to stand with independence within 4 days. 3. Patient will ambulate with independence for 200 feet with the least restrictive device within 4 days. 4. Patient will ascend/descend 4 stairs with  handrail(s) with independence within 4 days. 5. Patient will verbalize and demonstrate understanding of spinal precautions (No bending, lifting greater than 5 lbs, or twisting; log-roll technique; frequent repositioning as instructed) within 4 days. physical Therapy TREATMENT  Patient: Megan Russo (11 y.o. female)  Date: 5/8/2018  Diagnosis: Lumbar adjacent segment disease with spondylolisthesis [M51.36, M43.16]  Lumbar stenosis with neurogenic claudication [M48.062] <principal problem not specified>  Procedure(s) (LRB):  L2-L5 LAMINECTOMY, L3-L5 FUSION, INSTRUMENTATION, TLIF, BONE GRAFT (N/A) 1 Day Post-Op  Precautions:    Chart, physical therapy assessment, plan of care and goals were reviewed. ASSESSMENT:  Pt comes to sit with CGA. Pt to stand with CGA. Pt ambulated 160ft with no device CGA. Pt Pt balance is fair to good on level surface. No assistive device needed at this. Pt left sitting in chair. Pt progressing well. Contininue goals. Progression toward goals:  [x]    Improving appropriately and progressing toward goals  []    Improving slowly and progressing toward goals  []    Not making progress toward goals and plan of care will be adjusted     PLAN:  Patient continues to benefit from skilled intervention to address the above impairments. Continue treatment per established plan of care.   Discharge Recommendations:  Home Health  Further Equipment Recommendations for Discharge:  rolling walker SUBJECTIVE:       OBJECTIVE DATA SUMMARY:   Critical Behavior:  Neurologic State: Alert  Orientation Level: Oriented X4  Cognition: Appropriate decision making, Follows commands  Safety/Judgement: Awareness of environment  Functional Mobility Training:  Bed Mobility:  Rolling: Supervision  Supine to Sit: Contact guard assistance  Sit to Supine:  (not tested, patient OOB in chair)  Scooting: Supervision        Transfers:  Sit to Stand: Contact guard assistance  Stand to Sit: Contact guard assistance                            Balance:  Sitting: Intact  Standing: With support  Standing - Static: Good  Standing - Dynamic : Fair  Ambulation/Gait Training:  Distance (ft): 160 Feet (ft)     Ambulation - Level of Assistance: Contact guard assistance     Gait Description (WDL): Exceptions to WDL  Gait Abnormalities: Path deviations        Base of Support: Widened     Speed/Gina: Pace decreased (<100 feet/min)          Pain:  Pain Scale 1: Numeric (0 - 10)  Pain Intensity 1: 0  Pain Location 1: Back  Pain Orientation 1: Right  Pain Description 1: Aching     Activity Tolerance:   Pt tolerated treatment well. Please refer to the flowsheet for vital signs taken during this treatment.   After treatment:   [x]    Patient left in no apparent distress sitting up in chair  []    Patient left in no apparent distress in bed  []    Call bell left within reach  []    Nursing notified  []    Caregiver present  []    Bed alarm activated    COMMUNICATION/COLLABORATION:   The patients plan of care was discussed with: Physical Therapist    Cyrus Dong PTA   Time Calculation: 23 mins

## 2018-05-08 NOTE — PROGRESS NOTES
Problem: Self Care Deficits Care Plan (Adult)  Goal: *Acute Goals and Plan of Care (Insert Text)  Occupational Therapy Goals  Initiated 5/8/2018  1. Patient will perform lower body dressing with supervision/set-up, using AE PRN, within 7 day(s). 2.  Patient will perform upper body dressing with supervision/set-up, including don/doff brace, within 7 day(s). 3.  Patient will perform grooming, standing at sink, with supervision within 7 day(s). 4.  Patient will perform toilet transfers with supervision/set-up within 7 day(s). 5.  Patient will perform all aspects of toileting with supervision/set-up within 7 day(s). 6.  Patient will participate in upper extremity therapeutic exercise/activities with supervision/set-up for 10 minutes within 7 day(s). 7.  Patient will utilize energy conservation techniques during functional activities with verbal cues within 7 day(s). Occupational Therapy EVALUATION  Patient: Gigi Cunningham (18 y.o. female)  Date: 5/8/2018  Primary Diagnosis: Lumbar adjacent segment disease with spondylolisthesis [M51.36, M43.16]  Lumbar stenosis with neurogenic claudication [M48.062]  Procedure(s) (LRB):  L2-L5 LAMINECTOMY, L3-L5 FUSION, INSTRUMENTATION, TLIF, BONE GRAFT (N/A) 1 Day Post-Op   Precautions: fall, back, brace when OOB        ASSESSMENT :  Based on the objective data described below, the patient presents with hospital admission secondary to L2-L5 laminectomy, L3-L5 fusion. Patient received in bed agreeable to OT evaluation. She reports good pain control and requesting toileting tasks at this time. Patient educated on all back precautions, brace wear schedule and log roll technique. She verbalized understanding. Patient able to perform log roll with supervision with verbal cues, CGA for supine to sitting. Upon sitting patient able to don brace with min assist and verbal cues. She requires only CGA for sit to stand but min assist for transfer to commode in bathroom. Patient noted reaching for EOB, walls, grab bar with ambulation. Patient encouraged for HHA vs reaching for objects. Patient able to manage toileting tasks with min assist in total, but able to perform bladder hygiene with supervision while seated on commode. Patient left in NAD at end of session seated in bedside chair. Patient reports spouse at home and able to assist upon discharge. Patient may benefit from home health at discharge. Patient will benefit from skilled intervention to address the above impairments. Patients rehabilitation potential is considered to be Good  Factors which may influence rehabilitation potential include:   [x]             None noted  []             Mental ability/status  []             Medical condition  []             Home/family situation and support systems  []             Safety awareness  []             Pain tolerance/management  []             Other:      PLAN :  Recommendations and Planned Interventions:  [x]               Self Care Training                  [x]        Therapeutic Activities  [x]               Functional Mobility Training    []        Cognitive Retraining  [x]               Therapeutic Exercises           [x]        Endurance Activities  [x]               Balance Training                   []        Neuromuscular Re-Education  []               Visual/Perceptual Training     [x]   Home Safety Training  [x]               Patient Education                 [x]        Family Training/Education  []               Other (comment):    Frequency/Duration: Patient will be followed by occupational therapy 5 times a week to address goals. Discharge Recommendations: Home Health  Further Equipment Recommendations for Discharge: none noted      SUBJECTIVE:   Patient stated I often reach out for things to hold on to when I walk in the house. If its there, I grab it.     OBJECTIVE DATA SUMMARY:   HISTORY:   Past Medical History:   Diagnosis Date    Colitis, ischemic ([facility] [address] 2017    Headache     Other ill-defined conditions(799.89)     high cholesterol    Other ill-defined conditions(799.89)     sinus congestion    Thyroid disease     hypothyroidism     Past Surgical History:   Procedure Laterality Date    BREAST SURGERY PROCEDURE UNLISTED  1969    cyst on left breast    HX CERVICAL FUSION N/A     HX COLONOSCOPY  2017    HX HYSTERECTOMY  1983    HX ORTHOPAEDIC  2001    right shoulder    HX OTHER SURGICAL  2005    benign right breast tumor       Prior Level of Function/Environment/Context: modified independent. Pain limiting   Occupations in which the patient is/was successful, what are the barriers preventing that success:   Performance Patterns (routines, roles, habits, and rituals):   Personal Interests and/or values:   Expanded or extensive additional review of patient history:     Home Situation  Home Environment: Private residence  # Steps to Enter: 5  Rails to Enter: Yes  Hand Rails : Right  One/Two Story Residence: Two story, live on 1st floor  Living Alone: No  Support Systems: Spouse/Significant Other/Partner  Patient Expects to be Discharged to[de-identified] Private residence  Current DME Used/Available at Home: Adaptive dressing aides  Tub or Shower Type: Shower  [x]  Right hand dominant   []  Left hand dominant    EXAMINATION OF PERFORMANCE DEFICITS:  Cognitive/Behavioral Status:  Neurologic State: Alert  Orientation Level: Oriented X4  Cognition: Appropriate decision making; Follows commands  Perception: Appears intact  Perseveration: No perseveration noted  Safety/Judgement: Awareness of environment     Skin: intact as seen    Edema: none noted     Hearing:   Auditory  Auditory Impairment: None    Vision/Perceptual:                                Corrective Lenses: Reading glasses    Range of Motion:  AROM: Within functional limits                         Strength:    Strength: Generally decreased, functional                Coordination:  Coordination: Within functional limits  Fine Motor Skills-Upper: Left Intact; Right Intact    Gross Motor Skills-Upper: Left Intact; Right Intact    Tone & Sensation:  Tone: Normal  Sensation: Intact                      Balance:  Sitting: Intact  Standing: With support  Standing - Static: Good  Standing - Dynamic : Fair    Functional Mobility and Transfers for ADLs:  Bed Mobility:  Rolling: Supervision  Supine to Sit: Contact guard assistance  Sit to Supine:  (not tested, patient OOB in chair)  Scooting: Supervision    Transfers:  Sit to Stand: Contact guard assistance  Stand to Sit: Contact guard assistance  Bed to Chair: Minimum assistance  Toilet Transfer : Minimum assistance    ADL Assessment:  Feeding: Independent    Oral Facial Hygiene/Grooming: Contact guard assistance (standing )    Bathing: Moderate assistance    Upper Body Dressing: Minimum assistance (including brace )    Lower Body Dressing: Moderate assistance    Toileting: Minimum assistance                ADL Intervention and task modifications:                                     Cognitive Retraining  Safety/Judgement: Awareness of environment    Therapeutic Exercise:    Functional Measure:  Barthel Index:    Bathin  Bladder: 10  Bowels: 10  Groomin  Dressin  Feeding: 10  Mobility: 10  Stairs: 5  Toilet Use: 5  Transfer (Bed to Chair and Back): 10  Total: 70       Barthel and G-code impairment scale:  Percentage of impairment CH  0% CI  1-19% CJ  20-39% CK  40-59% CL  60-79% CM  80-99% CN  100%   Barthel Score 0-100 100 99-80 79-60 59-40 20-39 1-19   0   Barthel Score 0-20 20 17-19 13-16 9-12 5-8 1-4 0      The Barthel ADL Index: Guidelines  1. The index should be used as a record of what a patient does, not as a record of what a patient could do. 2. The main aim is to establish degree of independence from any help, physical or verbal, however minor and for whatever reason. 3. The need for supervision renders the patient not independent.   4. A patient's performance should be established using the best available evidence. Asking the patient, friends/relatives and nurses are the usual sources, but direct observation and common sense are also important. However direct testing is not needed. 5. Usually the patient's performance over the preceding 24-48 hours is important, but occasionally longer periods will be relevant. 6. Middle categories imply that the patient supplies over 50 per cent of the effort. 7. Use of aids to be independent is allowed. Shira Hodge., Barthel, D.W. ([id number]). Functional evaluation: the Barthel Index. [address] [facility] (14)2. Guilherme Grady dajuan CHANDA Arroyo, Michelle Bonilla., Eran Mcdaniels., [place], [facility] (1999). Measuring the change indisability after inpatient rehabilitation; comparison of the responsiveness of the Barthel Index and Functional [place] Measure. Journal of Neurology, Neurosurgery, and Psychiatry, 66(4), 203-286. Jocelin Huang, N.J.A, FELICITAS Hurd, & Jaqueline Arnold MCarA. (2004.) Assessment of post-stroke quality of life in cost-effectiveness studies: The usefulness of the Barthel Index and the EuroQoL-5D. Quality of Life Research, 13, 726-13         G codes: In compliance with CMSs Claims Based Outcome Reporting, the following G-code set was chosen for this patient based on their primary functional limitation being treated: The outcome measure chosen to determine the severity of the functional limitation was the Barthel Index with a score of 70/100 which was correlated with the impairment scale. ?  Self Care:     - CURRENT STATUS: CJ - 20%-39% impaired, limited or restricted    - GOAL STATUS: CI - 1%-19% impaired, limited or restricted    - D/C STATUS:  ---------------To be determined---------------     Occupational Therapy Evaluation Charge Determination   History Examination Decision-Making   LOW Complexity : Brief history review  LOW Complexity : 1-3 performance deficits relating to physical, cognitive , or psychosocial skils that result in activity limitations and / or participation restrictions  LOW Complexity : No comorbidities that affect functional and no verbal or physical assistance needed to complete eval tasks       Based on the above components, the patient evaluation is determined to be of the following complexity level: LOW   Pain:  Pain Scale 1: Numeric (0 - 10)  Pain Intensity 1: 0  Pain Location 1: Back  Pain Orientation 1: Right  Pain Description 1: Aching     Activity Tolerance:   VSS  Please refer to the flowsheet for vital signs taken during this treatment. After treatment:   [x] Patient left in no apparent distress sitting up in chair  [] Patient left in no apparent distress in bed  [x] Call bell left within reach  [x] Nursing notified  [] Caregiver present  [x] Bed alarm activated    COMMUNICATION/EDUCATION:   The patients plan of care was discussed with: Physical Therapist and Registered Nurse. [x] Home safety education was provided and the patient/caregiver indicated understanding. [x] Patient/family have participated as able in goal setting and plan of care. [x] Patient/family agree to work toward stated goals and plan of care. [] Patient understands intent and goals of therapy, but is neutral about his/her participation. [] Patient is unable to participate in goal setting and plan of care. This patients plan of care is appropriate for delegation to [facility].     Thank you for this referral.  Yudi Driver, OTR/L  Time Calculation: 30 mins

## 2018-05-08 NOTE — PROGRESS NOTES
Problem: Falls - Risk of  Goal: *Absence of Falls  Document Nani Fall Risk and appropriate interventions in the flowsheet. Outcome: Progressing Towards Goal  Fall Risk Interventions:  Mobility Interventions: Bed/chair exit alarm, Communicate number of staff needed for ambulation/transfer, Patient to call before getting OOB, Utilize walker, cane, or other assitive device         Medication Interventions: Bed/chair exit alarm, Patient to call before getting OOB, Teach patient to arise slowly, Utilize gait belt for transfers/ambulation    Elimination Interventions: Bed/chair exit alarm, Call light in reach, Patient to call for help with toileting needs, Toileting schedule/hourly rounds             Problem: Pressure Injury - Risk of  Goal: *Prevention of pressure injury  Document Luis Scale and appropriate interventions in the flowsheet. Outcome: Progressing Towards Goal  Pressure Injury Interventions:  Sensory Interventions: Assess changes in LOC, Check visual cues for pain, Float heels, Minimize linen layers, Pressure redistribution bed/mattress (bed type), Turn and reposition approx.  every two hours (pillows and wedges if needed)         Activity Interventions: Increase time out of bed, Pressure redistribution bed/mattress(bed type), PT/OT evaluation    Mobility Interventions: Float heels, HOB 30 degrees or less, Pressure redistribution bed/mattress (bed type), PT/OT evaluation    Nutrition Interventions: Document food/fluid/supplement intake, Offer support with meals,snacks and hydration

## 2018-05-08 NOTE — PROGRESS NOTES
Problem: Mobility Impaired (Adult and Pediatric)  Goal: *Acute Goals and Plan of Care (Insert Text)  Physical Therapy Goals  Initiated 5/8/2018    1. Patient will move from supine to sit and sit to supine , scoot up and down and roll side to side in bed with independence within 4 days. 2. Patient will perform sit to stand with independence within 4 days. 3. Patient will ambulate with independence for 200 feet with the least restrictive device within 4 days. 4. Patient will ascend/descend 4 stairs with  handrail(s) with independence within 4 days. 5. Patient will verbalize and demonstrate understanding of spinal precautions (No bending, lifting greater than 5 lbs, or twisting; log-roll technique; frequent repositioning as instructed) within 4 days. physical Therapy EVALUATION  Patient: Elsa Denise (49 y.o. female)  Date: 5/8/2018  Primary Diagnosis: Lumbar adjacent segment disease with spondylolisthesis [M51.36, M43.16]  Lumbar stenosis with neurogenic claudication [M48.062]  Procedure(s) (LRB):  L2-L5 LAMINECTOMY, L3-L5 FUSION, INSTRUMENTATION, TLIF, BONE GRAFT (N/A) 1 Day Post-Op   Precautions: Log roll, Back brace when OOB, may be up to bathroom without the back brace. ASSESSMENT :  Based on the objective data described below, the patient presents with difficulty with ambulation. Provided patient with LSO back brace as ordered. Reviewed back precautions and donning of back brace verbalized understanding. Sit on the edge of bed CGA, sit to stand CGA, ambulate without assistive device out on the 4th floor hallway, no loss of balance. OOB to chair as tolerated performed some active range of motion exercise on both LE all planes. Notified nurse who agreed to monitor and assist back to bed when ready. Patient will benefit from skilled intervention to address the above impairments.   Patients rehabilitation potential is considered to be Excellent  Factors which may influence rehabilitation potential include:   [x]         None noted  []         Mental ability/status  []         Medical condition  []         Home/family situation and support systems  []         Safety awareness  []         Pain tolerance/management  []         Other:      PLAN :  Recommendations and Planned Interventions:  [x]           Bed Mobility Training             []    Neuromuscular Re-Education  [x]           Transfer Training                   []    Orthotic/Prosthetic Training  [x]           Gait Training                         []    Modalities  [x]           Therapeutic Exercises           []    Edema Management/Control  [x]           Therapeutic Activities            []    Patient and Family Training/Education  []           Other (comment):    Frequency/Duration: Patient will be followed by physical therapy  twice daily to address goals. Discharge Recommendations: Home Health  Further Equipment Recommendations for Discharge: already has DME's     SUBJECTIVE:   Patient stated Ready to sit up on the chair.     OBJECTIVE DATA SUMMARY:   HISTORY:    Past Medical History:   Diagnosis Date    Colitis, ischemic ([facility] [address] 2017    Headache     Other ill-defined conditions(799.89)     high cholesterol    Other ill-defined conditions(799.89)     sinus congestion    Thyroid disease     hypothyroidism     Past Surgical History:   Procedure Laterality Date    BREAST SURGERY PROCEDURE UNLISTED  1969    cyst on left breast    HX CERVICAL FUSION N/A     HX COLONOSCOPY  2017    HX HYSTERECTOMY  1983    HX ORTHOPAEDIC  2001    right shoulder    HX OTHER SURGICAL  2005    benign right breast tumor     Prior Level of Function/Home Situation: Independent community ambulator without assistive device.   Personal factors and/or comorbidities impacting plan of care:     Home Situation  Home Environment: Private residence  # Steps to Enter: 5  Rails to Enter: Yes  Hand Rails : Right  One/Two Story Residence: Two story, live on 1st floor  Living Alone: No  Support Systems: Spouse/Significant Other/Partner  Patient Expects to be Discharged to[de-identified] Private residence  Current DME Used/Available at Home: Adaptive dressing aides  Tub or Shower Type: Shower    EXAMINATION/PRESENTATION/DECISION MAKING:   Critical Behavior:  Neurologic State: Alert  Orientation Level: Oriented X4  Cognition: Appropriate decision making, Follows commands  Safety/Judgement: Awareness of environment  Hearing: Auditory  Auditory Impairment: None    Range Of Motion:  AROM: Within functional limits                       Strength:    Strength: Generally decreased, functional                    Tone & Sensation:   Tone: Normal              Sensation: Intact               Coordination:  Coordination: Within functional limits  Vision:   Corrective Lenses: Reading glasses  Functional Mobility:  Bed Mobility:  Rolling: Supervision  Supine to Sit: Contact guard assistance  Sit to Supine:  (not tested, patient OOB in chair)  Scooting: Supervision  Transfers:  Sit to Stand: Contact guard assistance  Stand to Sit: Contact guard assistance        Bed to Chair: Minimum assistance              Balance:   Sitting: Intact  Standing: With support  Standing - Static: Good  Standing - Dynamic : Fair  Ambulation/Gait Training:  Distance (ft): 100 Feet (ft)     Ambulation - Level of Assistance: Contact guard assistance     Gait Description (WDL): Exceptions to WDL  Gait Abnormalities: Path deviations        Base of Support: Widened     Speed/Gina: Pace decreased (<100 feet/min)             Therapeutic Exercises:    Instructed patient to continue active range of motion exercise on both legs while up on chair or on bed.        Functional Measure:  Barthel Index:    Bathin  Bladder: 10  Bowels: 10  Groomin  Dressin  Feeding: 10  Mobility: 10  Stairs: 5  Toilet Use: 5  Transfer (Bed to Chair and Back): 10  Total: 70       Barthel and G-code impairment scale:  Percentage of impairment CH  0% CI  1-19% CJ  20-39% CK  40-59% CL  60-79% CM  80-99% CN  100%   Barthel Score 0-100 100 99-80 79-60 59-40 20-39 1-19   0   Barthel Score 0-20 20 17-19 13-16 9-12 5-8 1-4 0      The Barthel ADL Index: Guidelines  1. The index should be used as a record of what a patient does, not as a record of what a patient could do. 2. The main aim is to establish degree of independence from any help, physical or verbal, however minor and for whatever reason. 3. The need for supervision renders the patient not independent. 4. A patient's performance should be established using the best available evidence. Asking the patient, friends/relatives and nurses are the usual sources, but direct observation and common sense are also important. However direct testing is not needed. 5. Usually the patient's performance over the preceding 24-48 hours is important, but occasionally longer periods will be relevant. 6. Middle categories imply that the patient supplies over 50 per cent of the effort. 7. Use of aids to be independent is allowed. Edward Hampton., Barthel, DCarW. ([id number]). Functional evaluation: the Barthel Index. [address] [facility] (14)2. CHANDA Be, Brian Fields., Joana Osler., [facility], [address] [place] [address] (1999). Measuring the change indisability after inpatient rehabilitation; comparison of the responsiveness of the Barthel Index and Functional [place] Measure. Journal of Neurology, Neurosurgery, and Psychiatry, 66(4), 099-198. Michelle Atwood, N.J.DANIA, FELICITAS Hurd, & Derrell Blankenship, M.A. (2004.) Assessment of post-stroke quality of life in cost-effectiveness studies: The usefulness of the Barthel Index and the EuroQoL-5D. Quality of Life Research, 13, 830-62       G codes: In compliance with CMSs Claims Based Outcome Reporting, the following G-code set was chosen for this patient based on their primary functional limitation being treated:     The outcome measure chosen to determine the severity of the functional limitation was the barthel with a score of 70/100 which was correlated with the impairment scale. ? Mobility - Walking and Moving Around:     - CURRENT STATUS: CJ - 20%-39% impaired, limited or restricted    - GOAL STATUS: CI - 1%-19% impaired, limited or restricted    - D/C STATUS:  ---------------To be determined---------------      Physical Therapy Evaluation Charge Determination   History Examination Presentation Decision-Making   LOW Complexity : Zero comorbidities / personal factors that will impact the outcome / POC LOW Complexity : 1-2 Standardized tests and measures addressing body structure, function, activity limitation and / or participation in recreation  LOW Complexity : Stable, uncomplicated  Other outcome measures barthel  LOW       Based on the above components, the patient evaluation is determined to be of the following complexity level: LOW     Pain:  Pain Scale 1: Numeric (0 - 10)  Pain Intensity 1: 0  Pain Location 1: Back  Pain Orientation 1: Right  Pain Description 1: Aching     Activity Tolerance:   Good. Please refer to the flowsheet for vital signs taken during this treatment. After treatment:   [x]         Patient left in no apparent distress sitting up in chair  []         Patient left in no apparent distress in bed  [x]         Call bell left within reach  [x]         Nursing notified  []         Caregiver present  []         Bed alarm activated    COMMUNICATION/EDUCATION:   The patients plan of care was discussed with: Occupational Therapist, Registered Nurse and patient. [x]         Fall prevention education was provided and the patient/caregiver indicated understanding. [x]         Patient/family have participated as able in goal setting and plan of care. [x]         Patient/family agree to work toward stated goals and plan of care. []         Patient understands intent and goals of therapy, but is neutral about his/her participation.   [] Patient is unable to participate in goal setting and plan of care. Thank you for this referral.  Talon Avelar PT,WCC.    Time Calculation: 27 mins

## 2018-05-08 NOTE — PROGRESS NOTES
ORTHOPAEDIC LUMBAR FUSION PROGRESS NOTE    NAME:     Jay Majano   :       1945   MRN:       [id number]   DATE:      2018    POD:    1 Day Post-Op  S/P:    Procedure(s):  L2-L5 LAMINECTOMY, L3-L5 FUSION, INSTRUMENTATION, TLIF, BONE GRAFT    SUBJECTIVE:    C/O back pain along surgical incision  No leg pain, L leg numbness unchanged from pre-op  Denies nausea/vomiting, chest pain, headache or shortness of breath  Pain controlled    Recent Labs      18   0156   HGB  10.5*   NA  140   K  4.5   CL  106   CO2  24   BUN  22*   CREA  0.94   GLU  142*     Patient Vitals for the past 12 hrs:   BP Temp Pulse Resp SpO2   18 0335 92/57 97.8 °F (36.6 °C) 65 17 98 %   18 0143 92/52 - - - -   18 0113 (!) 88/54 - - - -   18 0052 (!) 86/51 97.5 °F (36.4 °C) 82 16 98 %   18 0001 94/60 97.7 °F (36.5 °C) 86 16 98 %   18 2130 101/61 97.5 °F (36.4 °C) 89 16 98 %   18 108/63 - 98 14 95 %   18 110/59 - (!) 102 12 97 %   18 114/62 - 97 11 97 %   18 113/61 97.5 °F (36.4 °C) (!) 103 11 96 %   18 116/65 - (!) 101 - 97 %   18 112/69 - 96 14 96 %   18 113/67 - (!) 104 13 98 %   18 120/68 - (!) 104 13 98 %   18 117/65 - (!) 108 15 98 %   18 118/67 97.6 °F (36.4 °C) (!) 107 15 94 %   18 118/67 - - 14 -       EXAM:  Dressings clean, dry and intact   Positive strength/ROM bilat lower ext.   Neuro intact to sensation  Calves, soft & nontender  BL LEs NVID      PLAN:  D/C PCA, change to PO pain medications  PT/OT, OOB w/ assist  Advance regular diet      Didier Hong [place]  Orthopaedic Surgery  Physician Assistant to Dr. Stacey Willett

## 2018-05-08 NOTE — PERIOP NOTES
TRANSFER - OUT REPORT:     Verbal report given to MARIELLA Adames (name) on Moe Orourke  being transferred to [facility] (unit) for routine post - op       Report consisted of patients Situation, Background, Assessment and   Recommendations(SBAR). Information from the following report(s) SBAR, Kardex, Intake/Output, MAR and Recent Results was reviewed with the receiving nurse. Lines:   Peripheral IV 05/07/18 Right Wrist (Active)   Site Assessment Clean, dry, & intact 5/7/2018  8:09 PM   Phlebitis Assessment 0 5/7/2018  8:09 PM   Infiltration Assessment 0 5/7/2018  8:09 PM   Dressing Status Clean, dry, & intact 5/7/2018  8:09 PM   Dressing Type Transparent;Tape 5/7/2018  8:09 PM   Hub Color/Line Status Pink 5/7/2018  8:09 PM   Alcohol Cap Used Yes 5/7/2018  8:09 PM       Peripheral IV 05/07/18 Left Wrist (Active)   Site Assessment Clean, dry, & intact 5/7/2018  8:09 PM   Phlebitis Assessment 0 5/7/2018  8:09 PM   Infiltration Assessment 0 5/7/2018  8:09 PM   Dressing Status Clean, dry, & intact 5/7/2018  8:09 PM   Dressing Type Transparent;Tape 5/7/2018  8:09 PM   Hub Color/Line Status Green 5/7/2018  8:09 PM   Alcohol Cap Used Yes 5/7/2018  8:09 PM        Opportunity for questions and clarification was provided.       Patient transported with:   O2 @ 2 liters  Registered Nurse

## 2018-05-08 NOTE — PROGRESS NOTES
Spiritual Care Partner Volunteer visited patient on [address] [facility] on 5/8/18. Documented by:    VIDHI Araiza.S.   Spiritual Care Department  If needs arise please call ANA MARIA ([phone])

## 2018-05-09 LAB
ANION GAP SERPL CALC-SCNC: 3 MMOL/L (ref 5–15)
BUN SERPL-MCNC: 16 MG/DL (ref 6–20)
BUN/CREAT SERPL: 19 (ref 12–20)
CALCIUM SERPL-MCNC: 8.1 MG/DL (ref 8.5–10.1)
CHLORIDE SERPL-SCNC: 106 MMOL/L (ref 97–108)
CO2 SERPL-SCNC: 31 MMOL/L (ref 21–32)
CREAT SERPL-MCNC: 0.83 MG/DL (ref 0.55–1.02)
GLUCOSE SERPL-MCNC: 103 MG/DL (ref 65–100)
HGB BLD-MCNC: 10.1 G/DL (ref 11.5–16)
POTASSIUM SERPL-SCNC: 4.1 MMOL/L (ref 3.5–5.1)
SODIUM SERPL-SCNC: 140 MMOL/L (ref 136–145)

## 2018-05-09 PROCEDURE — 97530 THERAPEUTIC ACTIVITIES: CPT

## 2018-05-09 PROCEDURE — 74011250637 HC RX REV CODE- 250/637: Performed by: NURSE PRACTITIONER

## 2018-05-09 PROCEDURE — 65270000029 HC RM PRIVATE

## 2018-05-09 PROCEDURE — 74011250637 HC RX REV CODE- 250/637: Performed by: ORTHOPAEDIC SURGERY

## 2018-05-09 PROCEDURE — 80048 BASIC METABOLIC PNL TOTAL CA: CPT | Performed by: ORTHOPAEDIC SURGERY

## 2018-05-09 PROCEDURE — 97535 SELF CARE MNGMENT TRAINING: CPT

## 2018-05-09 PROCEDURE — 36415 COLL VENOUS BLD VENIPUNCTURE: CPT | Performed by: ORTHOPAEDIC SURGERY

## 2018-05-09 PROCEDURE — 97116 GAIT TRAINING THERAPY: CPT

## 2018-05-09 PROCEDURE — 85018 HEMOGLOBIN: CPT | Performed by: ORTHOPAEDIC SURGERY

## 2018-05-09 RX ORDER — HYDROMORPHONE HYDROCHLORIDE 2 MG/1
4 TABLET ORAL
Status: DISCONTINUED | OUTPATIENT
Start: 2018-05-09 | End: 2018-05-11 | Stop reason: HOSPADM

## 2018-05-09 RX ORDER — HYDROMORPHONE HYDROCHLORIDE 2 MG/1
2 TABLET ORAL
Status: DISCONTINUED | OUTPATIENT
Start: 2018-05-09 | End: 2018-05-11 | Stop reason: HOSPADM

## 2018-05-09 RX ADMIN — HYDROMORPHONE HYDROCHLORIDE 4 MG: 2 TABLET ORAL at 18:44

## 2018-05-09 RX ADMIN — HYDROMORPHONE HYDROCHLORIDE 4 MG: 2 TABLET ORAL at 12:52

## 2018-05-09 RX ADMIN — FAMOTIDINE 20 MG: 20 TABLET ORAL at 09:04

## 2018-05-09 RX ADMIN — FAMOTIDINE 20 MG: 20 TABLET ORAL at 18:44

## 2018-05-09 RX ADMIN — LEVOTHYROXINE SODIUM 50 MCG: 50 TABLET ORAL at 06:03

## 2018-05-09 RX ADMIN — POLYETHYLENE GLYCOL (3350) 17 G: 17 POWDER, FOR SOLUTION ORAL at 09:04

## 2018-05-09 RX ADMIN — FLUTICASONE PROPIONATE 1 SPRAY: 50 SPRAY, METERED NASAL at 21:22

## 2018-05-09 RX ADMIN — OXYCODONE HYDROCHLORIDE 10 MG: 5 TABLET ORAL at 02:50

## 2018-05-09 RX ADMIN — HYDROMORPHONE HYDROCHLORIDE 4 MG: 2 TABLET ORAL at 23:37

## 2018-05-09 RX ADMIN — OXYCODONE HYDROCHLORIDE 10 MG: 5 TABLET ORAL at 06:02

## 2018-05-09 RX ADMIN — Medication 10 ML: at 21:23

## 2018-05-09 RX ADMIN — STANDARDIZED SENNA CONCENTRATE AND DOCUSATE SODIUM 1 TABLET: 8.6; 5 TABLET, FILM COATED ORAL at 18:44

## 2018-05-09 RX ADMIN — Medication 10 ML: at 14:12

## 2018-05-09 RX ADMIN — ATORVASTATIN CALCIUM 10 MG: 10 TABLET, FILM COATED ORAL at 21:22

## 2018-05-09 RX ADMIN — STANDARDIZED SENNA CONCENTRATE AND DOCUSATE SODIUM 1 TABLET: 8.6; 5 TABLET, FILM COATED ORAL at 09:04

## 2018-05-09 RX ADMIN — OXYCODONE HYDROCHLORIDE 10 MG: 5 TABLET ORAL at 09:04

## 2018-05-09 RX ADMIN — CLONAZEPAM 2 MG: 1 TABLET ORAL at 21:22

## 2018-05-09 NOTE — PROGRESS NOTES
PHYSICAL THERAPY:Pt reporting increased back pain this afternoon and declined PT. PT will follow in AM.

## 2018-05-09 NOTE — PROGRESS NOTES
Problem: Self Care Deficits Care Plan (Adult)  Goal: *Acute Goals and Plan of Care (Insert Text)  Occupational Therapy Goals  Initiated 5/8/2018  1. Patient will perform lower body dressing with supervision/set-up, using AE PRN, within 7 day(s). 2.  Patient will perform upper body dressing with supervision/set-up, including don/doff brace, within 7 day(s). 3.  Patient will perform grooming, standing at sink, with supervision within 7 day(s). 4.  Patient will perform toilet transfers with supervision/set-up within 7 day(s). 5.  Patient will perform all aspects of toileting with supervision/set-up within 7 day(s). 6.  Patient will participate in upper extremity therapeutic exercise/activities with supervision/set-up for 10 minutes within 7 day(s). 7.  Patient will utilize energy conservation techniques during functional activities with verbal cues within 7 day(s). Occupational Therapy TREATMENT  Patient: Jay Molina (9 y.o. female)  Date: 5/9/2018  Diagnosis: Lumbar adjacent segment disease with spondylolisthesis [M51.36, M43.16]  Lumbar stenosis with neurogenic claudication [M48.062] <principal problem not specified>  Procedure(s) (LRB):  L2-L5 LAMINECTOMY, L3-L5 FUSION, INSTRUMENTATION, TLIF, BONE GRAFT (N/A) 2 Days Post-Op  Precautions:    Chart, occupational therapy assessment, plan of care, and goals were reviewed. ASSESSMENT:  Patient received sitting in bedside chair performing bathing task with PCT. Patient agreeable to continue bathing with OT . Patient requires min assist overall for bathing due to increased pain and limitations secondary to back precautions. Patient able to transfer to [facility] in bathroom with CGA. She manages hygiene in sitting with supervision. Patient transfers to EOB with CGA, requires same level of assist for sit to supine, and verbal cues to maintain log roll technique and best position.  Patient will benefit from continued OT services to increase safety and independence with ADL tasks. Recommend HH at discharge. Progression toward goals:  [x]       Improving appropriately and progressing toward goals  []       Improving slowly and progressing toward goals  []       Not making progress toward goals and plan of care will be adjusted     PLAN:  Patient continues to benefit from skilled intervention to address the above impairments. Continue treatment per established plan of care. Discharge Recommendations:  Home Health  Further Equipment Recommendations for Discharge:  None noted      SUBJECTIVE:   Patient stated I am hurting so much more today.     OBJECTIVE DATA SUMMARY:   Cognitive/Behavioral Status:  Neurologic State: Alert  Orientation Level: Oriented X4  Cognition: Appropriate decision making; Follows commands  Perception: Appears intact  Perseveration: No perseveration noted  Safety/Judgement: Awareness of environment    Functional Mobility and Transfers for ADLs:  Bed Mobility:  Rolling: Contact guard assistance  Supine to Sit: Contact guard assistance  Sit to Supine: Contact guard assistance; Additional time  Scooting: Supervision    Transfers:  Sit to Stand: Contact guard assistance  Functional Transfers  Bathroom Mobility: Contact guard assistance  Toilet Transfer : Contact guard assistance  Cues: Verbal cues provided       Balance:  Sitting: Intact  Standing: Intact; With support    ADL Intervention:       Grooming  Grooming Assistance: Supervision/set up  Washing Face: Supervision/set-up    Upper Body Bathing  Bathing Assistance: Supervision/set-up  Position Performed: Standing    Lower Body Bathing  Bathing Assistance: Minimum assistance  Perineal  : Minimum assistance  Position Performed: Standing  Cues: Verbal cues provided (physical assistance )  Lower Body : Minimum assistance  Position Performed: Seated in chair  Cues: Verbal cues provided;Physical assistance    Upper Body Dressing Assistance  Orthotics(Brace):  Minimum assistance  Hospital Gown: Minimum  assistance    Toileting  Toileting Assistance: Contact guard assistance  Bladder Hygiene: Contact guard assistance  Clothing Management: Contact guard assistance  Cues: Verbal cues provided    Cognitive Retraining  Safety/Judgement: Awareness of environment    Neuro Re-Education:           Therapeutic Exercises:     Pain:  Pain Scale 1: Numeric (0 - 10)  Pain Intensity 1: 0  Pain Location 1: Back  Pain Orientation 1: Anterior  Pain Description 1: Aching  Pain Intervention(s) 1: Medication (see MAR)  Activity Tolerance:   VSS  Please refer to the flowsheet for vital signs taken during this treatment.   After treatment:   [] Patient left in no apparent distress sitting up in chair  [x] Patient left in no apparent distress in bed  [x] Call bell left within reach  [x] Nursing notified  [] Caregiver present  [x] Bed alarm activated    COMMUNICATION/COLLABORATION:   The patients plan of care was discussed with: Physical Therapist and Registered Nurse    Yudi Driver OTR/L  Time Calculation: 28 mins

## 2018-05-09 NOTE — PROGRESS NOTES
Problem: Falls - Risk of  Goal: *Absence of Falls  Document Nani Fall Risk and appropriate interventions in the flowsheet. Outcome: Progressing Towards Goal  Fall Risk Interventions:  Mobility Interventions: Bed/chair exit alarm, Communicate number of staff needed for ambulation/transfer, Patient to call before getting OOB         Medication Interventions: Bed/chair exit alarm, Patient to call before getting OOB, Teach patient to arise slowly    Elimination Interventions: Bed/chair exit alarm, Call light in reach, Patient to call for help with toileting needs, Toileting schedule/hourly rounds             Problem: Pressure Injury - Risk of  Goal: *Prevention of pressure injury  Document Luis Scale and appropriate interventions in the flowsheet.    Outcome: Progressing Towards Goal  Pressure Injury Interventions:  Sensory Interventions: Assess changes in LOC, Check visual cues for pain         Activity Interventions: Increase time out of bed, Pressure redistribution bed/mattress(bed type), PT/OT evaluation    Mobility Interventions: HOB 30 degrees or less, Pressure redistribution bed/mattress (bed type), PT/OT evaluation    Nutrition Interventions: Document food/fluid/supplement intake, Offer support with meals,snacks and hydration

## 2018-05-09 NOTE — PROGRESS NOTES
ORTHOPAEDIC LUMBAR FUSION PROGRESS NOTE    NAME:     Evangelist Eason   :       1945   MRN:       [id number]   DATE:      2018    POD:    2 Days Post-Op  S/P:    Procedure(s):  L2-L5 LAMINECTOMY, L3-L5 FUSION, INSTRUMENTATION, TLIF, BONE GRAFT    SUBJECTIVE:    C/O back pain along surgical incision  No leg pain or numbness  Denies nausea/vomiting, chest pain, headache or shortness of breath  Pain controlled    Recent Labs      18   0253   HGB  10.1*   NA  140   K  4.1   CL  106   CO2  31   BUN  16   CREA  0.83   GLU  103*     Patient Vitals for the past 12 hrs:   BP Temp Pulse Resp SpO2   18 0300 121/70 98.6 °F (37 °C) 85 14 97 %   18 0000 99/58 99.1 °F (37.3 °C) 88 16 98 %       EXAM:  Dressings clean, dry and intact   Positive strength/ROM bilat lower ext.   Neuro intact to sensation  Calves, soft & nontender  BL LEs NVID      PLAN:  Continue prn PO pain medications  PT/OT, OOB w/ assist  Tolerating diet      Awa [facility] [place]  Orthopaedic Surgery  Physician Assistant to Dr. Gunner Yeager

## 2018-05-09 NOTE — PROGRESS NOTES
Problem: Falls - Risk of  Goal: *Absence of Falls  Document Nani Fall Risk and appropriate interventions in the flowsheet.    Outcome: Progressing Towards Goal  Fall Risk Interventions:  Mobility Interventions: Bed/chair exit alarm, Communicate number of staff needed for ambulation/transfer, Patient to call before getting OOB         Medication Interventions: Bed/chair exit alarm, Patient to call before getting OOB, Teach patient to arise slowly    Elimination Interventions: Bed/chair exit alarm, Call light in reach, Patient to call for help with toileting needs, Toileting schedule/hourly rounds

## 2018-05-09 NOTE — PROGRESS NOTES
Bedside and Verbal shift change report given to Xenia Willett RN (oncoming nurse) by Cynthia Singh RN (offgoing nurse). Report included the following information SBAR, Kardex, Procedure Summary, Intake/Output, MAR, Accordion and Recent Results.

## 2018-05-09 NOTE — PROGRESS NOTES
Problem: Pressure Injury - Risk of  Goal: *Prevention of pressure injury  Document Luis Scale and appropriate interventions in the flowsheet.    Outcome: Progressing Towards Goal  Pressure Injury Interventions:  Sensory Interventions: Assess changes in LOC, Check visual cues for pain         Activity Interventions: Increase time out of bed, Pressure redistribution bed/mattress(bed type), PT/OT evaluation    Mobility Interventions: HOB 30 degrees or less, Pressure redistribution bed/mattress (bed type), PT/OT evaluation    Nutrition Interventions: Document food/fluid/supplement intake, Offer support with meals,snacks and hydration

## 2018-05-09 NOTE — PROGRESS NOTES
Problem: Mobility Impaired (Adult and Pediatric)  Goal: *Acute Goals and Plan of Care (Insert Text)  Physical Therapy Goals  Initiated 5/8/2018    1. Patient will move from supine to sit and sit to supine , scoot up and down and roll side to side in bed with independence within 4 days. 2. Patient will perform sit to stand with independence within 4 days. 3. Patient will ambulate with independence for 200 feet with the least restrictive device within 4 days. 4. Patient will ascend/descend 4 stairs with  handrail(s) with independence within 4 days. 5. Patient will verbalize and demonstrate understanding of spinal precautions (No bending, lifting greater than 5 lbs, or twisting; log-roll technique; frequent repositioning as instructed) within 4 days. physical Therapy TREATMENT  Patient: Laverne Rhodes (44 y.o. female)  Date: 5/9/2018  Diagnosis: Lumbar adjacent segment disease with spondylolisthesis [M51.36, M43.16]  Lumbar stenosis with neurogenic claudication [M48.062] <principal problem not specified>  Procedure(s) (LRB):  L2-L5 LAMINECTOMY, L3-L5 FUSION, INSTRUMENTATION, TLIF, BONE GRAFT (N/A) 2 Days Post-Op  Precautions:    Chart, physical therapy assessment, plan of care and goals were reviewed. ASSESSMENT:  Pt comes to sit with CGA. Pt donned back brace min assist.Pt ambulated 130ft with RW CGA. Pt up and down 4 steps with rails CGA. Pt with increased pain and discomfort mobilizing today. Pt did not use RW yesterday but needed RW today to stabilize gait. Pt left sitting. Continue goals. Progression toward goals:  []    Improving appropriately and progressing toward goals  [x]    Improving slowly and progressing toward goals  []    Not making progress toward goals and plan of care will be adjusted     PLAN:  Patient continues to benefit from skilled intervention to address the above impairments. Continue treatment per established plan of care.   Discharge Recommendations:  Home Health  Further Equipment Recommendations for Discharge:  rolling walker     SUBJECTIVE:       OBJECTIVE DATA SUMMARY:   Critical Behavior:  Neurologic State: Alert, Appropriate for age  Orientation Level: Oriented X4  Cognition: Appropriate decision making, Appropriate for age attention/concentration, Appropriate safety awareness  Safety/Judgement: Awareness of environment  Functional Mobility Training:  Bed Mobility:     Supine to Sit: Contact guard assistance  Sit to Supine: Contact guard assistance     Level of Assistance: Contact guard assistance     Transfers:  Sit to Stand: Contact guard assistance  Stand to Sit: Contact guard assistance                             Balance:  Sitting: Intact  Standing: Intact; Without support  Ambulation/Gait Training:  Distance (ft): 130 Feet (ft)  Assistive Device: Gait belt;Walker, rolling  Ambulation - Level of Assistance: Contact guard assistance                       Speed/Gina: Pace decreased (<100 feet/min)  Step Length: Right shortened;Left shortened                    Stairs:  Number of Stairs Trained: 4  Stairs - Level of Assistance: Contact guard assistance          Pain:  Pain Scale 1: Numeric (0 - 10)  Pain Intensity 1: 0  Pain Location 1: Back  Pain Orientation 1: Anterior  Pain Description 1: Aching  Pain Intervention(s) 1: Medication (see MAR)  Activity Tolerance:   Pt tolerated treatment fairly well. Please refer to the flowsheet for vital signs taken during this treatment.   After treatment:   [x]    Patient left in no apparent distress sitting up in chair  []    Patient left in no apparent distress in bed  []    Call bell left within reach  []    Nursing notified  []    Caregiver present  []    Bed alarm activated    COMMUNICATION/COLLABORATION:   The patients plan of care was discussed with: Physical Therapist    Moe Moyer PTA   Time Calculation: 23 mins

## 2018-05-10 LAB
ANION GAP SERPL CALC-SCNC: 3 MMOL/L (ref 5–15)
BUN SERPL-MCNC: 9 MG/DL (ref 6–20)
BUN/CREAT SERPL: 13 (ref 12–20)
CALCIUM SERPL-MCNC: 8.3 MG/DL (ref 8.5–10.1)
CHLORIDE SERPL-SCNC: 102 MMOL/L (ref 97–108)
CO2 SERPL-SCNC: 32 MMOL/L (ref 21–32)
CREAT SERPL-MCNC: 0.67 MG/DL (ref 0.55–1.02)
GLUCOSE SERPL-MCNC: 104 MG/DL (ref 65–100)
HGB BLD-MCNC: 9 G/DL (ref 11.5–16)
POTASSIUM SERPL-SCNC: 3.8 MMOL/L (ref 3.5–5.1)
SODIUM SERPL-SCNC: 137 MMOL/L (ref 136–145)

## 2018-05-10 PROCEDURE — 74011250637 HC RX REV CODE- 250/637: Performed by: ORTHOPAEDIC SURGERY

## 2018-05-10 PROCEDURE — 74011250637 HC RX REV CODE- 250/637: Performed by: NURSE PRACTITIONER

## 2018-05-10 PROCEDURE — 80048 BASIC METABOLIC PNL TOTAL CA: CPT | Performed by: ORTHOPAEDIC SURGERY

## 2018-05-10 PROCEDURE — 97530 THERAPEUTIC ACTIVITIES: CPT

## 2018-05-10 PROCEDURE — 85018 HEMOGLOBIN: CPT | Performed by: ORTHOPAEDIC SURGERY

## 2018-05-10 PROCEDURE — 97535 SELF CARE MNGMENT TRAINING: CPT

## 2018-05-10 PROCEDURE — 97116 GAIT TRAINING THERAPY: CPT

## 2018-05-10 PROCEDURE — 36415 COLL VENOUS BLD VENIPUNCTURE: CPT | Performed by: ORTHOPAEDIC SURGERY

## 2018-05-10 PROCEDURE — 65270000029 HC RM PRIVATE

## 2018-05-10 RX ORDER — PROMETHAZINE HYDROCHLORIDE 25 MG/1
25 TABLET ORAL
Qty: 60 TAB | Refills: 2 | Status: SHIPPED | OUTPATIENT
Start: 2018-05-10

## 2018-05-10 RX ORDER — AMOXICILLIN 250 MG
1 CAPSULE ORAL 2 TIMES DAILY
Qty: 60 TAB | Refills: 2 | Status: SHIPPED | OUTPATIENT
Start: 2018-05-10

## 2018-05-10 RX ORDER — HYDROMORPHONE HYDROCHLORIDE 2 MG/1
TABLET ORAL
Qty: 90 TAB | Refills: 0 | Status: SHIPPED | OUTPATIENT
Start: 2018-05-10

## 2018-05-10 RX ORDER — NALOXONE HYDROCHLORIDE 4 MG/.1ML
1 SPRAY NASAL AS NEEDED
Qty: 2 EACH | Refills: 5 | Status: SHIPPED | OUTPATIENT
Start: 2018-05-10

## 2018-05-10 RX ORDER — CYCLOBENZAPRINE HCL 10 MG
10 TABLET ORAL
Qty: 60 TAB | Refills: 2 | Status: SHIPPED | OUTPATIENT
Start: 2018-05-10

## 2018-05-10 RX ADMIN — ATORVASTATIN CALCIUM 10 MG: 10 TABLET, FILM COATED ORAL at 20:58

## 2018-05-10 RX ADMIN — FLUTICASONE PROPIONATE 1 SPRAY: 50 SPRAY, METERED NASAL at 20:58

## 2018-05-10 RX ADMIN — CLONAZEPAM 2 MG: 1 TABLET ORAL at 20:58

## 2018-05-10 RX ADMIN — Medication 10 ML: at 20:59

## 2018-05-10 RX ADMIN — LEVOTHYROXINE SODIUM 50 MCG: 50 TABLET ORAL at 06:04

## 2018-05-10 RX ADMIN — HYDROMORPHONE HYDROCHLORIDE 2 MG: 2 TABLET ORAL at 21:04

## 2018-05-10 RX ADMIN — STANDARDIZED SENNA CONCENTRATE AND DOCUSATE SODIUM 1 TABLET: 8.6; 5 TABLET, FILM COATED ORAL at 09:03

## 2018-05-10 RX ADMIN — POLYETHYLENE GLYCOL (3350) 17 G: 17 POWDER, FOR SOLUTION ORAL at 09:03

## 2018-05-10 RX ADMIN — STANDARDIZED SENNA CONCENTRATE AND DOCUSATE SODIUM 1 TABLET: 8.6; 5 TABLET, FILM COATED ORAL at 17:12

## 2018-05-10 RX ADMIN — HYDROMORPHONE HYDROCHLORIDE 2 MG: 2 TABLET ORAL at 15:43

## 2018-05-10 RX ADMIN — HYDROMORPHONE HYDROCHLORIDE 4 MG: 2 TABLET ORAL at 06:04

## 2018-05-10 RX ADMIN — Medication 10 ML: at 06:04

## 2018-05-10 RX ADMIN — Medication 10 ML: at 15:44

## 2018-05-10 RX ADMIN — FAMOTIDINE 20 MG: 20 TABLET ORAL at 17:12

## 2018-05-10 RX ADMIN — FAMOTIDINE 20 MG: 20 TABLET ORAL at 09:03

## 2018-05-10 RX ADMIN — HYDROMORPHONE HYDROCHLORIDE 4 MG: 2 TABLET ORAL at 10:56

## 2018-05-10 NOTE — PROGRESS NOTES
5/10/2018  11:36 AM  CM consult for RW noted. CM completed referral to [facility] who accepted. CM delivered RW. No additional DC service needs indicated.

## 2018-05-10 NOTE — PROGRESS NOTES
Problem: Mobility Impaired (Adult and Pediatric)  Goal: *Acute Goals and Plan of Care (Insert Text)  Physical Therapy Goals  Initiated 5/8/2018    1. Patient will move from supine to sit and sit to supine , scoot up and down and roll side to side in bed with independence within 4 days. 2. Patient will perform sit to stand with independence within 4 days. 3. Patient will ambulate with independence for 200 feet with the least restrictive device within 4 days. 4. Patient will ascend/descend 4 stairs with  handrail(s) with independence within 4 days. 5. Patient will verbalize and demonstrate understanding of spinal precautions (No bending, lifting greater than 5 lbs, or twisting; log-roll technique; frequent repositioning as instructed) within 4 days. physical Therapy TREATMENT  Patient: Cindy Boss (53 y.o. female)  Date: 5/10/2018  Diagnosis: Lumbar adjacent segment disease with spondylolisthesis [M51.36, M43.16]  Lumbar stenosis with neurogenic claudication [M48.062] <principal problem not specified>  Procedure(s) (LRB):  L2-L5 LAMINECTOMY, L3-L5 FUSION, INSTRUMENTATION, TLIF, BONE GRAFT (N/A) 3 Days Post-Op  Precautions:    Chart, physical therapy assessment, plan of care and goals were reviewed. ASSESSMENT:  Pt comes to sit with CGA. Pt to stand with CGA. Pt donned back brace with min assist.Pt ambulated 160ft with RW CGA. Pt ambulated at slow and steady pace. Pts balance good. Pt moving baxter comfortably this afternoon. Pt is ready for D/C when medically stable. Progression toward goals:  []    Improving appropriately and progressing toward goals  [x]    Improving slowly and progressing toward goals  []    Not making progress toward goals and plan of care will be adjusted     PLAN:  Patient continues to benefit from skilled intervention to address the above impairments. Continue treatment per established plan of care.   Discharge Recommendations:  Home Health  Further Equipment Recommendations for Discharge:  rolling walker     SUBJECTIVE:       OBJECTIVE DATA SUMMARY:   Critical Behavior:  Neurologic State: Alert, Appropriate for age  Orientation Level: Oriented X4  Cognition: Appropriate decision making, Appropriate for age attention/concentration, Appropriate safety awareness, Follows commands  Safety/Judgement: Awareness of environment  Functional Mobility Training:  Bed Mobility:   Pt comes to sit with CGA. Transfers:  Sit to Stand: Contact guard assistance  Stand to Sit: Contact guard assistance                             Balance:  Sitting: Intact  Standing: Intact; With support  Standing - Static: Good  Ambulation/Gait Training:  Distance (ft): 160 Feet (ft)  Assistive Device: Gait belt;Walker, rolling  Ambulation - Level of Assistance: Contact guard assistance        Gait Abnormalities: Decreased step clearance        Base of Support: Narrowed     Speed/Gina: Pace decreased (<100 feet/min)  Step Length: Left shortened;Right shortened                Pain:  Pain Scale 1: Numeric (0 - 10)  Pain Intensity 1: 5  Pain Location 1: Back  Pain Orientation 1: Lower  Pain Description 1: Aching  Pain Intervention(s) 1: Medication (see MAR)  Activity Tolerance:   Pt tolerated treatment well. Please refer to the flowsheet for vital signs taken during this treatment.   After treatment:   [x]    Patient left in no apparent distress sitting up in chair  []    Patient left in no apparent distress in bed  []    Call bell left within reach  []    Nursing notified  []    Caregiver present  []    Bed alarm activated    COMMUNICATION/COLLABORATION:   The patients plan of care was discussed with: Physical Therapist    Moe Moyer PTA   Time Calculation: 23 mins

## 2018-05-10 NOTE — PROGRESS NOTES
ORTHOPAEDIC LUMBAR FUSION PROGRESS NOTE    NAME:     Laverne Rhodes   :       1945   MRN:       [id number]   DATE:      5/10/2018    POD:    3 Days Post-Op  S/P:    Procedure(s):  L2-L5 LAMINECTOMY, L3-L5 FUSION, INSTRUMENTATION, TLIF, BONE GRAFT    SUBJECTIVE:    C/O back pain along surgical incision  No leg pain or numbness  Denies nausea/vomiting, chest pain, headache or shortness of breath  Pain controlled    Recent Labs      05/10/18   0425   HGB  9.0*   NA  137   K  3.8   CL  102   CO2  32   BUN  9   CREA  0.67   GLU  104*     Patient Vitals for the past 12 hrs:   BP Temp Pulse Resp SpO2   05/10/18 0324 102/55 99.3 °F (37.4 °C) 87 16 90 %   18 2324 114/62 99.7 °F (37.6 °C) 88 16 91 %       EXAM:  Dressings clean, dry and intact   Positive strength/ROM bilat lower ext. Neuro intact to sensation  Calves, soft & nontender  BL LEs NVID      PLAN:  Continue PRN PO pain medications  PT/OT, OOB w/ assist  Tolerating diet      BREANA Tapia  Orthopaedic Surgery  Physician Assistant to Dr. Roderick Chin    3:14 PM  Hemovac drain output d/w Dr. Brunilda Treviño. Drain can by d/c'd and pt can be discharge home w/ home health today.      BREANA Tapia

## 2018-05-10 NOTE — PROGRESS NOTES
Spine    Pt still having difficulty with ambulation due to pain. HV drain - 68mL in 7 hours. Discussed with BREANA Dawkins. Will keep drain in overnight. Plan to have patient trial the stairs with PT in the morning if feeling better and discharge around noon if medically stable.      Ayana Watson, DIOMEDES

## 2018-05-10 NOTE — PROGRESS NOTES
Bedside and Verbal shift change report given to Ramona Downey RN (oncoming nurse) by Krystina Treviño RN (offgoing nurse). Report included the following information SBAR and Kardex.

## 2018-05-10 NOTE — PROGRESS NOTES
Problem: Self Care Deficits Care Plan (Adult)  Goal: *Acute Goals and Plan of Care (Insert Text)  Occupational Therapy Goals  Initiated 5/8/2018  1. Patient will perform lower body dressing with supervision/set-up, using AE PRN, within 7 day(s). 2.  Patient will perform upper body dressing with supervision/set-up, including don/doff brace, within 7 day(s). 3.  Patient will perform grooming, standing at sink, with supervision within 7 day(s). 4.  Patient will perform toilet transfers with supervision/set-up within 7 day(s). 5.  Patient will perform all aspects of toileting with supervision/set-up within 7 day(s). 6.  Patient will participate in upper extremity therapeutic exercise/activities with supervision/set-up for 10 minutes within 7 day(s). 7.  Patient will utilize energy conservation techniques during functional activities with verbal cues within 7 day(s). Occupational Therapy TREATMENT  Patient: Gigi Cunningham (46 y.o. female)  Date: 5/10/2018  Diagnosis: Lumbar adjacent segment disease with spondylolisthesis [M51.36, M43.16]  Lumbar stenosis with neurogenic claudication [M48.062] <principal problem not specified>  Procedure(s) (LRB):  L2-L5 LAMINECTOMY, L3-L5 FUSION, INSTRUMENTATION, TLIF, BONE GRAFT (N/A) 3 Days Post-Op  Precautions:    Chart, occupational therapy assessment, plan of care, and goals were reviewed. ASSESSMENT:  Pt stating she feels much better today pain wise. She was able to verbalize 2/3 back precautions and verbal cueing for no lifting over 5 lbs. After sitting edge of bed, pt ambulated to bathroom and transferred to [facility] with stand by assist. She was able to perform her own hygiene and stood to return to sit in chair. Min assist to don back brace. Pt left with PT. Recommend home health.    Progression toward goals:  [x]       Improving appropriately and progressing toward goals  []       Improving slowly and progressing toward goals  []       Not making progress toward goals and plan of care will be adjusted     PLAN:  Patient continues to benefit from skilled intervention to address the above impairments. Continue treatment per established plan of care. Discharge Recommendations:  Home health  Further Equipment Recommendations for Discharge: Rolling walker     SUBJECTIVE:   Patient stated I am much better today.     OBJECTIVE DATA SUMMARY:   Cognitive/Behavioral Status:  Neurologic State: Alert; Appropriate for age  Orientation Level: Oriented X4  Cognition: Appropriate safety awareness             Functional Mobility and Transfers for ADLs:  Bed Mobility:   Contact guard supine to sit. Transfers:     Functional Transfers  Toilet Transfer : Stand-by assistance       Balance:  Sitting: Intact  Standing: Intact; With support    ADL Intervention:                      Upper Body Dressing Assistance  Orthotics(Brace): Minimum assistance         Toileting  Bladder Hygiene: Stand-by assistance       Pain:  Pain Scale 1: Numeric (0 - 10)  Pain Intensity 1: 5  Pain Location 1: Back  Pain Orientation 1: Lower  Pain Description 1: Aching  Pain Intervention(s) 1: Medication (see MAR)  Activity Tolerance:   No signs/symptoms of distress or discomfort during OT  Please refer to the flowsheet for vital signs taken during this treatment.   After treatment:   [x] Patient left in no apparent distress sitting up in chair  [] Patient left in no apparent distress in bed  [x] Call bell left within reach  [x] Nursing notified  [] Caregiver present  [x] Bed alarm activated    COMMUNICATION/COLLABORATION:   The patients plan of care was discussed with: Physical Therapy Assistant, Occupational Therapist and Registered Nurse    JAMES Diamond  Time Calculation: 14 mins

## 2018-05-10 NOTE — PROGRESS NOTES
Problem: Mobility Impaired (Adult and Pediatric)  Goal: *Acute Goals and Plan of Care (Insert Text)  Physical Therapy Goals  Initiated 5/8/2018    1. Patient will move from supine to sit and sit to supine , scoot up and down and roll side to side in bed with independence within 4 days. 2. Patient will perform sit to stand with independence within 4 days. 3. Patient will ambulate with independence for 200 feet with the least restrictive device within 4 days. 4. Patient will ascend/descend 4 stairs with  handrail(s) with independence within 4 days. 5. Patient will verbalize and demonstrate understanding of spinal precautions (No bending, lifting greater than 5 lbs, or twisting; log-roll technique; frequent repositioning as instructed) within 4 days. physical Therapy TREATMENT  Patient: Charlotte Painting (51 y.o. female)  Date: 5/10/2018  Diagnosis: Lumbar adjacent segment disease with spondylolisthesis [M51.36, M43.16]  Lumbar stenosis with neurogenic claudication [M48.062] <principal problem not specified>  Procedure(s) (LRB):  L2-L5 LAMINECTOMY, L3-L5 FUSION, INSTRUMENTATION, TLIF, BONE GRAFT (N/A) 3 Days Post-Op  Precautions:    Chart, physical therapy assessment, plan of care and goals were reviewed. ASSESSMENT:  Pt sitting in chair on arrival of PT with back brace in place. Pt reports less pain today. Pt CGA to stand. Pt ambulated 130ft with RW CGA at slow pace. Pt left sitting. Pt tolerated treatment better today. Pt will need RW at D/C to stabilize gait. PT will continue to follow. Pt ready for D/C when medically stable. Progression toward goals:  [x]    Improving appropriately and progressing toward goals  []    Improving slowly and progressing toward goals  []    Not making progress toward goals and plan of care will be adjusted     PLAN:  Patient continues to benefit from skilled intervention to address the above impairments. Continue treatment per established plan of care.   Discharge Recommendations: Home Health  Further Equipment Recommendations for Discharge:  rolling walker     SUBJECTIVE:       OBJECTIVE DATA SUMMARY:   Critical Behavior:  Neurologic State: Alert, Appropriate for age  Orientation Level: Oriented X4  Cognition: Appropriate safety awareness  Safety/Judgement: Awareness of environment  Functional Mobility Training:              Transfers:  Sit to Stand: Contact guard assistance  Stand to Sit: Contact guard assistance                             Balance:  Sitting: Intact  Standing: Intact; With support  Standing - Static: Good  Ambulation/Gait Training:  Distance (ft): 130 Feet (ft)  Assistive Device: Gait belt;Walker, rolling  Ambulation - Level of Assistance: Contact guard assistance        Gait Abnormalities: Decreased step clearance        Base of Support: Narrowed     Speed/Gina: Pace decreased (<100 feet/min)  Step Length: Left shortened;Right shortened                    Pain:  Pain Scale 1: Numeric (0 - 10)  Pain Intensity 1: 5  Pain Location 1: Back  Pain Orientation 1: Lower  Pain Description 1: Aching  Pain Intervention(s) 1: Medication (see MAR)  Activity Tolerance:   Pt tolerated treatment well. Please refer to the flowsheet for vital signs taken during this treatment.   After treatment:   [x]    Patient left in no apparent distress sitting up in chair  []    Patient left in no apparent distress in bed  []    Call bell left within reach  []    Nursing notified  []    Caregiver present  []    Bed alarm activated    COMMUNICATION/COLLABORATION:   The patients plan of care was discussed with: Physical Therapist    Alcira Blum PTA   Time Calculation: 23 mins

## 2018-05-10 NOTE — PROGRESS NOTES
Bedside and Verbal shift change report given to Ruchi Prado RN (oncoming nurse) by Artur Mora RN (offgoing nurse). Report included the following information SBAR, Kardex, Procedure Summary, Intake/Output, MAR and Recent Results.

## 2018-05-10 NOTE — PROGRESS NOTES
Problem: Falls - Risk of  Goal: *Absence of Falls  Document Nani Fall Risk and appropriate interventions in the flowsheet. Outcome: Progressing Towards Goal  Fall Risk Interventions:  Mobility Interventions: Bed/chair exit alarm, PT Consult for mobility concerns, Patient to call before getting OOB, PT Consult for assist device competence, Utilize walker, cane, or other assitive device, Utilize gait belt for transfers/ambulation, Strengthening exercises (ROM-active/passive)         Medication Interventions: Utilize gait belt for transfers/ambulation, Teach patient to arise slowly, Evaluate medications/consider consulting pharmacy, Bed/chair exit alarm, Assess postural VS orthostatic hypotension, Patient to call before getting OOB    Elimination Interventions:  Toileting schedule/hourly rounds, Toilet paper/wipes in reach, Patient to call for help with toileting needs, Elevated toilet seat, Call light in reach, Bed/chair exit alarm

## 2018-05-11 VITALS
RESPIRATION RATE: 16 BRPM | HEART RATE: 77 BPM | WEIGHT: 149.69 LBS | OXYGEN SATURATION: 94 % | TEMPERATURE: 98.2 F | SYSTOLIC BLOOD PRESSURE: 107 MMHG | DIASTOLIC BLOOD PRESSURE: 63 MMHG | BODY MASS INDEX: 25.69 KG/M2

## 2018-05-11 LAB
ANION GAP SERPL CALC-SCNC: 4 MMOL/L (ref 5–15)
BUN SERPL-MCNC: 8 MG/DL (ref 6–20)
BUN/CREAT SERPL: 13 (ref 12–20)
CALCIUM SERPL-MCNC: 8.4 MG/DL (ref 8.5–10.1)
CHLORIDE SERPL-SCNC: 103 MMOL/L (ref 97–108)
CO2 SERPL-SCNC: 32 MMOL/L (ref 21–32)
CREAT SERPL-MCNC: 0.63 MG/DL (ref 0.55–1.02)
GLUCOSE SERPL-MCNC: 106 MG/DL (ref 65–100)
HGB BLD-MCNC: 9 G/DL (ref 11.5–16)
POTASSIUM SERPL-SCNC: 3.7 MMOL/L (ref 3.5–5.1)
SODIUM SERPL-SCNC: 139 MMOL/L (ref 136–145)

## 2018-05-11 PROCEDURE — 97530 THERAPEUTIC ACTIVITIES: CPT

## 2018-05-11 PROCEDURE — 74011250637 HC RX REV CODE- 250/637: Performed by: PHYSICIAN ASSISTANT

## 2018-05-11 PROCEDURE — 97535 SELF CARE MNGMENT TRAINING: CPT

## 2018-05-11 PROCEDURE — 74011250637 HC RX REV CODE- 250/637: Performed by: NURSE PRACTITIONER

## 2018-05-11 PROCEDURE — 74011250637 HC RX REV CODE- 250/637: Performed by: ORTHOPAEDIC SURGERY

## 2018-05-11 PROCEDURE — 36415 COLL VENOUS BLD VENIPUNCTURE: CPT | Performed by: ORTHOPAEDIC SURGERY

## 2018-05-11 PROCEDURE — 97116 GAIT TRAINING THERAPY: CPT

## 2018-05-11 PROCEDURE — 80048 BASIC METABOLIC PNL TOTAL CA: CPT | Performed by: ORTHOPAEDIC SURGERY

## 2018-05-11 PROCEDURE — 85018 HEMOGLOBIN: CPT | Performed by: ORTHOPAEDIC SURGERY

## 2018-05-11 PROCEDURE — 74011250636 HC RX REV CODE- 250/636: Performed by: PHYSICIAN ASSISTANT

## 2018-05-11 RX ORDER — ADHESIVE BANDAGE
30 BANDAGE TOPICAL
Status: COMPLETED | OUTPATIENT
Start: 2018-05-11 | End: 2018-05-11

## 2018-05-11 RX ORDER — DEXAMETHASONE SODIUM PHOSPHATE 4 MG/ML
10 INJECTION, SOLUTION INTRA-ARTICULAR; INTRALESIONAL; INTRAMUSCULAR; INTRAVENOUS; SOFT TISSUE
Status: COMPLETED | OUTPATIENT
Start: 2018-05-11 | End: 2018-05-11

## 2018-05-11 RX ADMIN — FAMOTIDINE 20 MG: 20 TABLET ORAL at 09:30

## 2018-05-11 RX ADMIN — STANDARDIZED SENNA CONCENTRATE AND DOCUSATE SODIUM 1 TABLET: 8.6; 5 TABLET, FILM COATED ORAL at 18:00

## 2018-05-11 RX ADMIN — POLYETHYLENE GLYCOL (3350) 17 G: 17 POWDER, FOR SOLUTION ORAL at 09:30

## 2018-05-11 RX ADMIN — HYDROMORPHONE HYDROCHLORIDE 4 MG: 2 TABLET ORAL at 09:37

## 2018-05-11 RX ADMIN — HYDROMORPHONE HYDROCHLORIDE 4 MG: 2 TABLET ORAL at 17:03

## 2018-05-11 RX ADMIN — BISACODYL 10 MG: 10 SUPPOSITORY RECTAL at 14:12

## 2018-05-11 RX ADMIN — STANDARDIZED SENNA CONCENTRATE AND DOCUSATE SODIUM 1 TABLET: 8.6; 5 TABLET, FILM COATED ORAL at 09:30

## 2018-05-11 RX ADMIN — LEVOTHYROXINE SODIUM 50 MCG: 50 TABLET ORAL at 05:07

## 2018-05-11 RX ADMIN — DEXAMETHASONE SODIUM PHOSPHATE 10 MG: 4 INJECTION, SOLUTION INTRAMUSCULAR; INTRAVENOUS at 09:36

## 2018-05-11 RX ADMIN — MAGNESIUM HYDROXIDE 30 ML: 400 SUSPENSION ORAL at 09:36

## 2018-05-11 RX ADMIN — HYDROMORPHONE HYDROCHLORIDE 4 MG: 2 TABLET ORAL at 02:40

## 2018-05-11 RX ADMIN — Medication 10 ML: at 13:48

## 2018-05-11 RX ADMIN — FAMOTIDINE 20 MG: 20 TABLET ORAL at 17:03

## 2018-05-11 NOTE — PROGRESS NOTES
Problem: Patient Education: Go to Patient Education Activity  Goal: Patient/Family Education  physical Therapy TREATMENT  Patient: Shamar Leo (43 y.o. female)  Date: 5/11/2018  Diagnosis: Lumbar adjacent segment disease with spondylolisthesis [M51.36, M43.16]  Lumbar stenosis with neurogenic claudication [M48.062] <principal problem not specified>  Procedure(s) (LRB):  L2-L5 LAMINECTOMY, L3-L5 FUSION, INSTRUMENTATION, TLIF, BONE GRAFT (N/A) 4 Days Post-Op  Precautions:    Chart, physical therapy assessment, plan of care and goals were reviewed. ASSESSMENT:  Pt sitting on arrival of PT with back brace in place. Pt to stand with CGA. Pt ambulated 120ft with RW CGA. Pt up and down 4 steps with rail and cane CGA. Pt is moving gingerly but remains primarily  CGA for mobility. Pt back to bed with CGA to min assist.Pt is ready for D/C from PT standpoint. Progression toward goals:  []    Improving appropriately and progressing toward goals  [x]    Improving slowly and progressing toward goals  []    Not making progress toward goals and plan of care will be adjusted     PLAN:  Patient continues to benefit from skilled intervention to address the above impairments. Continue treatment per established plan of care. Discharge Recommendations:  Home Health  Further Equipment Recommendations for Discharge:  rolling walker     SUBJECTIVE:       OBJECTIVE DATA SUMMARY:   Critical Behavior:  Neurologic State: Alert, Appropriate for age  Orientation Level: Oriented X4  Cognition: Appropriate decision making  Safety/Judgement: Awareness of environment  Functional Mobility Training:  Bed Mobility:        Sit to Supine: CGA to Min           Transfers:  Sit to Stand: Contact guard assistance  Stand to Sit: Contact guard assistance                             Balance:  Sitting: Intact  Standing: Intact; With support  Ambulation/Gait Training:  Distance (ft): 120 Feet (ft)  Assistive Device: Gait belt;Walker, rolling  Ambulation - Level of Assistance: Contact guard assistance                 Base of Support: Narrowed     Speed/Gina: Pace decreased (<100 feet/min)  Step Length: Right shortened;Left shortened                    Pain:  Pain Scale 1: Numeric (0 - 10)  Pain Intensity 1: 7  Pain Location 1: Back  Pain Orientation 1: Lower  Pain Description 1: Aching  Pain Intervention(s) 1: Medication (see MAR)  Activity Tolerance:   Pt tolerated treatment fairly well. Please refer to the flowsheet for vital signs taken during this treatment.   After treatment:   []    Patient left in no apparent distress sitting up in chair  [x]    Patient left in no apparent distress in bed  []    Call bell left within reach  []    Nursing notified  []    Caregiver present  []    Bed alarm activated    COMMUNICATION/COLLABORATION:   The patients plan of care was discussed with: Physical Therapist    Danette Manning PTA   Time Calculation: 23 mins

## 2018-05-11 NOTE — DISCHARGE INSTRUCTIONS
Lumbar Spinal Surgery Discharge Instructions   Dr. Byers Handler  [phone]    Activity:    Bita Ortiz You are going home a well person, be as active as possible. Your only exercise should be walking. Start with short frequent walks and increase your walking distance each day. Start with walking twice a day for 5 minutes and increase your distance each day 2-3 minutes until you reach 25 minutes twice a day. Limit the amount of time you sit to 20-30 minute intervals. Sitting for prolonged periods of time will be uncomfortable for you following your surgery.  No bending, lifting (of 5lbs or more), twisting, or straining.  Do not drive until your doctor states you may do so. However, you may ride in a car for short distances.  If you are required to wear a brace, you should wear it at all times when you are out of bed. You may remove it when sleeping unless your physician advises you against it.  When you are in the bed, you may lay on your back or on either side. Do not lie on your stomach.  You may resume sexual relations 3-4 weeks after surgery depending on how you are feeling.  Continue to use your incentive spirometer for deep breathing exercises. Driving:   You may not drive or return to work until instructed by your physician. However, you may ride in the car for short periods of time. Brace:   If you have a back brace, you should wear your brace at all times when you are out of bed. Do not wear the brace while in bed or showering.  Remember to always wear a cotton t-shirt underneath your brace.  Do not bend or twist when your brace is off. Diet:   You may resume your regular home diet as tolerated. If your throat is sore, you should eat soft foods for the first couple of days.  Be sure to drink plenty of fluids; it is important to keep yourself hydrated.  Avoid alcoholic beverages and ABSOLUTELY NO tobacco products.  Tobacco products will interfere with your healing. If you continue to use tobacco, you may end up needing another surgery in the future. Dressing: You have on a Prineo dressing. This is a waterproof bacteriostatic dressing that requires no post-operative care. Please do not peel the dressing off, or apply any oil based products, as they may expedite the deterioration of the mesh. The dressing will slowly chip off on its own.  Do not rub or apply lotion or ointments to incision site. Shower:   You may shower 5 days after your surgery.  You may remove your brace during showers.  NO not use tub baths, swimming pools or Jacuzzis. Medications:   Check with your physician before taking any anti-inflammatory medications. (Advil, Aleve, Ibuprofen, Aspirin)   Take your pain medication as directed   Do NOT take additional Tylenol if your prescribed pain medication has acetaminophen in it (Endocet/Percocet, Lortab, Norco)   It is important to have regular bowel movements. Pain medications can be constipating. Stool softeners, warm prune juice, increasing your water intake, and increasing fiber in your diet can help in preventing constipation.  Do NOT take laxatives if at all possible except in severe situations. It can result in a vicious cycle of constipation and diarrhea. Stockings:   You have been given T.E.D. stockings to wear. Continue to wear these for 7 days after your discharge. Put them on in the morning and take them off at night. They are used to increase your circulation and prevent blood clots from forming in your legs. Follow-Up    Please call ASAP to schedule your 1st post-operative appointment. This should be approximately 3 weeks from your surgery date. Notify your physician in you develop any of the following conditions:   Fever above 101 degrees for 24 hours.  Nausea or vomiting.  Severe headache.    Inability to urinate   Loss of bowel or bladder function (sudden onset of incontinence)   Changes in sensation in your extremities (numbness, tingling, loss of color).  Severe pain or pain not relieved by medications.  Redness, swelling, or drainage from your incision.  Persistent pain in the chest.    Pain in the calf of either leg.  Increased weakness (if this is greater than before your surgery). If you have any questions about your dressing contact your Orthopaedic Surgeons office. OFFICE OF DR. Eva Stinson   [phone]  OUR NEW ADDRESS IS [address] [place] [address], [address], [address] [place] [address]     YOU CAN RE-START TAKING YOUR DAILY 81 mg ASPIRIN AND MULTI-VITAMEN WHEN YOU ARE 1 WEEK OUT FROM SURGERY    ** IMPORTANT: UNDER YOUR HONEYCOMB DRESSING, YOU HAVE A PRINEO ADHESIVE MESH DIRECTLY OVER YOUR INCISION. THIS MESH WAS STERILELY GLUED TO YOUR SKIN DURING SURGERY. DO NOT REMOVE THIS. NO ONE ELSE SHOULD REMOVE IT EITHER. IT WILL COME OFF ON ITS OWN OVER TIME    YOUR HONEYCOMB DRESSING NEEDS TO BE REMOVED PRIOR TO SHOWERING. THEN THE PRINEO MESH CAN BE LEFT OPEN TO AIR    * WEAR YOUR BRACE AS ADVISED    * NO DRIVING UNTIL YOU ARE CLEARED TO DO SO BY YOUR SURGEON    * LIMIT LIFTING, BENDING AND TWISTING.  NO LIFTING MORE THAN 5 LBS    * MAKE SURE YOU ARE GETTING GOOD NUTRITION (Lean Protein, Vitamin D AND Calcium)    * DO NOT TAKE ANY NSAIDs FOR THE FIRST 3 MONTHS AFTER SURGERY (such as Advil/Ibuprofen/Motrin, Aleve/Naproxen/Naprosyn, Diclofenac, Celebrex, Meloxicam, Indomethacin, Goody's powder, BC powder etc.)    * NO NICOTINE PRODUCTS    * FULLY READ YOUR DISCHARGE INSTRUCTIONS

## 2018-05-11 NOTE — PROGRESS NOTES
Problem: Self Care Deficits Care Plan (Adult)  Goal: *Acute Goals and Plan of Care (Insert Text)  Occupational Therapy Goals  Initiated 5/8/2018  1. Patient will perform lower body dressing with supervision/set-up, using AE PRN, within 7 day(s). 2.  Patient will perform upper body dressing with supervision/set-up, including don/doff brace, within 7 day(s). 3.  Patient will perform grooming, standing at sink, with supervision within 7 day(s). 4.  Patient will perform toilet transfers with supervision/set-up within 7 day(s). 5.  Patient will perform all aspects of toileting with supervision/set-up within 7 day(s). 6.  Patient will participate in upper extremity therapeutic exercise/activities with supervision/set-up for 10 minutes within 7 day(s). 7.  Patient will utilize energy conservation techniques during functional activities with verbal cues within 7 day(s). Occupational Therapy TREATMENT  Patient: Evangelist Eason (44 y.o. female)  Date: 5/11/2018  Diagnosis: Lumbar adjacent segment disease with spondylolisthesis [M51.36, M43.16]  Lumbar stenosis with neurogenic claudication [M48.062] <principal problem not specified>  Procedure(s) (LRB):  L2-L5 LAMINECTOMY, L3-L5 FUSION, INSTRUMENTATION, TLIF, BONE GRAFT (N/A) 4 Days Post-Op  Precautions:    Chart, occupational therapy assessment, plan of care, and goals were reviewed. ASSESSMENT: Pt agreeable to OT. After supine to sit pt able to don pajama pants with use of reacher with contact guard. She donned tyrone shirt and brace with set-up. Reviewed back precautions and implications during ADL's. Pt progressing towards goals. Recommend home health.   Progression toward goals:  [x]       Improving appropriately and progressing toward goals  []       Improving slowly and progressing toward goals  []       Not making progress toward goals and plan of care will be adjusted     PLAN:  Patient continues to benefit from skilled intervention to address the above impairments. Continue treatment per established plan of care. Discharge Recommendations:  Home health  Further Equipment Recommendations for Discharge: Rolling walker     SUBJECTIVE:   Patient stated Rodger Izabel you so much.     OBJECTIVE DATA SUMMARY:   Cognitive/Behavioral Status:  Neurologic State: Alert; Appropriate for age  Orientation Level: Oriented X4  Cognition: Appropriate decision making             Functional Mobility and Transfers for ADLs:  Bed Mobility:     Contact guard supine to sit. Transfers:      Contact guard sit to stand       Balance:  Sitting: Intact  Standing: Intact; With support    ADL Intervention:       Grooming  Washing Face: Modified independent  Seated in the chair. Upper Body Dressing Assistance  Orthotics(Brace): Contact guard assistance  Pullover Shirt: Supervision/set-up    Lower Body Dressing Assistance  Dressing Assistance: Contact guard assistance  Pants With Elastic Waist: Contact guard assistance  Leg Crossed Method Used: No  Position Performed: Seated in chair  Cues: Don;Verbal cues provided  Adaptive Equipment Used: Reacher       Pain:  Pain Scale 1: Numeric (0 - 10)  Pain Intensity 1: 7  Pain Location 1: Back  Pain Orientation 1: Lower  Pain Description 1: Aching  Pain Intervention(s) 1: Medication (see MAR)  Activity Tolerance:   No signs/symptoms of distress or discomfort during OT. Please refer to the flowsheet for vital signs taken during this treatment.   After treatment:   [x] Patient left in no apparent distress sitting up in chair  [] Patient left in no apparent distress in bed  [x] Call bell left within reach  [x] Nursing notified  [x] Caregiver present  [x] Chair alarm activated    COMMUNICATION/COLLABORATION:   The patients plan of care was discussed with: Physical Therapy Assistant, Occupational Therapist and Registered Nurse    JAMES Falk  Time Calculation: 18 mins

## 2018-05-11 NOTE — PROGRESS NOTES
ORTHOPAEDIC LUMBAR FUSION PROGRESS NOTE    NAME:     Ayaka Brambila   :       1945   MRN:       [id number]   DATE:      2018    POD:    4 Days Post-Op  S/P:    Procedure(s):  L2-L5 LAMINECTOMY, L3-L5 FUSION, INSTRUMENTATION, TLIF, BONE GRAFT    SUBJECTIVE:    C/O back pain along surgical incision, also reports some numbness in her L foot  No leg pain  Denies nausea/vomiting, chest pain, headache or shortness of breath  Pain controlled    Recent Labs      18   0243   HGB  9.0*   NA  139   K  3.7   CL  103   CO2  32   BUN  8   CREA  0.63   GLU  106*     Patient Vitals for the past 12 hrs:   BP Temp Pulse Resp SpO2   18 0806 101/63 98 °F (36.7 °C) 89 18 97 %   18 0415 112/71 99.2 °F (37.3 °C) 89 18 91 %   05/10/18 2315 97/61 99.7 °F (37.6 °C) 96 18 94 %       EXAM:  Dressings clean, dry and intact   Positive strength/ROM bilat lower ext.   Neuro intact to sensation  Calves, soft & nontender  BL LEs NVID      PLAN:  Decadron x 1 dose  Continue prn PO pain medications  Milk of Magnesia- would like for pt to have a BM prior to d/c  PT/OT, OOB w/ assist (repeat stair training w/ PT today)  Tolerating diet    Plan to d/c patient if she is feeling improved later today, hemovac has been removed, cleared by PT and has a [address] [place], [place]  Orthopaedic Surgery  Physician Assistant to Dr. Kris Gutierres

## 2018-05-12 NOTE — PROGRESS NOTES
D/C to home. IV removed. Patient in no distress. D/C instructions given. Patient verbalized understanding.

## 2018-05-13 NOTE — DISCHARGE SUMMARY
DISCHARGE SUMMARY     Patient: Hannah Swift Record Number: [id number]                : 1945  Age: 81 y.o. Admit Date: 2018  Discharge Date: 2018  Admission Diagnosis: Lumbar adjacent segment disease with spondylolisthesis [M51.36, M43.16]  Lumbar stenosis with neurogenic claudication [M48.062]  Discharge Diagnosis: Lumbar adjacent segment disease with spondylolisthesis [M51.36, M43.16]  Lumbar stenosis with neurogenic claudication [M48.062]  Procedures: Procedure(s):  L2-L5 LAMINECTOMY, L3-L5 FUSION, INSTRUMENTATION, TLIF, BONE GRAFT  Surgeon: Dewey Ruiz MD  Co-surgeon:   Assistants:   Anesthesia: General  Complications: None     History of Present Illness:  Rowena Barriga is a 81 y.o. female with a history of intractable low back and radiculopathy. Despite conservative management and after clinical and radiographic evaluation, it was determined that she suffered from lumbar stenosis  and lumbar spondylolisthesis and would benefit from Procedure(s):  L2-L5 LAMINECTOMY, L3-L5 FUSION, INSTRUMENTATION, TLIF, BONE GRAFT, which she consented to undergo after a discussion of the risks, benefits, alternatives, rehab concerns, and potential complications of surgery. Hospital Course:  Rowena Barriga tolerated the procedure well. She was transferred  to the recovery room in stable condition. After a brief stay, the patient was then transferred to the Orthopedic floor. On postoperative day #1, the dressing was clean and dry and she was neurovascularly intact. The patient was afebrile and vital signs were stable. Calves were soft and non-tender bilaterally. Rowena Barriga made excellent progress with physical therapy and was discharged to Home w/ Home Health in stable condition on postoperative day 4.   She was provided with routine postoperative instructions and advised to follow up in my office in 3 weeks following discharge from the hospital.  She was given prescriptions for medication to control post-operative symptoms. Discharge Medications:  Discharge Medication List as of 5/11/2018  5:28 PM      START taking these medications    Details   HYDROmorphone (DILAUDID) 2 mg tablet Take 1-2 tablets by mouth every 4-6 hours as needed for pain, Print, Disp-90 Tab, R-0      cyclobenzaprine (FLEXERIL) 10 mg tablet Take 1 Tab by mouth three (3) times daily as needed for Muscle Spasm(s). , Print, Disp-60 Tab, R-2      senna-docusate (PERICOLACE) 8.6-50 mg per tablet Take 1 Tab by mouth two (2) times a day., Print, Disp-60 Tab, R-2      promethazine (PHENERGAN) 25 mg tablet Take 1 Tab by mouth every six (6) hours as needed for Nausea. , Print, Disp-60 Tab, R-2      naloxone (NARCAN) 4 mg/actuation nasal spray 1 [place] by IntraNASal route as needed. Apply 1 nasal spray to one nostril; may repeat every 2 to 3 minutes in alternating nostrils until medical assistance becomes available  Indications: OPIATE-INDUCED RESPIRATORY DEPRESSION, Opioid Toxicity, Print, Dis p-2 Each, R-5         CONTINUE these medications which have NOT CHANGED    Details   clonazePAM (KLONOPIN) 2 mg tablet Take 2 mg by mouth nightly., Historical Med      lutein 6 mg tab Take 1 Tab by mouth nightly. Indications: eye health, Historical Med      fluticasone (FLONASE) 50 mcg/actuation nasal spray 1 [place] by Both Nostrils route nightly. Indications: Allergic Rhinitis, Historical Med      PSEUDOEPHEDRINE/ACETAMINOPHEN (TYLENOL SINUS PO) Take 1 Tab by mouth two (2) times a day.   Historical Med, 1 Tab      levothyroxine (SYNTHROID) 50 mcg tablet Take 50 mcg by mouth every morning., Historical Med      ATORVASTATIN CALCIUM (ATORVASTATIN PO) Take 10 mg by mouth nightly., Historical Med         STOP taking these medications       multivit with min-folic acid (ADULT MULTIVITAMIN GUMMIES) 200 mcg chew Comments:   Reason for Stopping:         aspirin 81 mg chewable tablet Comments:   Reason for Stopping: naproxen sodium 220 mg cap Comments:   Reason for Stopping:               Chauncey Fiore  5/11/2018  Orthopaedic Surgery  Physician Assistant to Dr. Eric Diaz

## 2018-06-04 ENCOUNTER — HOSPITAL ENCOUNTER (OUTPATIENT)
Dept: VASCULAR SURGERY | Age: 73
Discharge: HOME | End: 2018-06-04
Attending: ORTHOPAEDIC SURGERY
Payer: MEDICARE

## 2018-06-04 DIAGNOSIS — Z98.1 ARTHRODESIS STATUS: ICD-10-CM

## 2018-06-04 PROCEDURE — 93971 EXTREMITY STUDY: CPT

## 2018-06-04 NOTE — PROCEDURES
[facility]  *** FINAL REPORT ***    Name: Kirby Calixto  MRN: [id number]    Outpatient  : 02 Sep 1945  HIS Order #: [id number]  [address] [facility] Visit #: [id number]  Date: 2018    TYPE OF TEST: Peripheral Venous Testing    REASON FOR TEST  Pain in limb    Right Leg:-  Deep venous thrombosis:           No  Superficial venous thrombosis:    No  Deep venous insufficiency:        No  Superficial venous insufficiency: No      INTERPRETATION/FINDINGS  PROCEDURE:  RIGHT LOWER EXTREMITY  VENOUS DUPLEX. Evaluation of lower  extremity veins with ultrasound (B-mode imaging, pulsed Doppler, color   Doppler). Includes the common femoral, deep femoral, femoral,  popliteal, posterior tibial, peroneal, and great saphenous veins. Other veins, for example the gastrocnemius and soleal veins, may also  be visualized. FINDINGS: Charl Dwayne scale and color flow duplex images of the veins in the  right lower extremity demonstrate normal compressibility, spontaneous  and augmented flow profiles, and absence of filling defects throughout   the deep and superficial veins in the right lower extremity. CONCLUSION: Right lower extremity venous duplex negative for deep  venous thrombosis or thrombophlebitis. Multiple lymph node noted in  the right groin, the largest measuring 2.53 x 0.47 cm. Left common  femoral vein is thrombus free. ADDITIONAL COMMENTS    I have personally reviewed the data relevant to the interpretation of  this  study. TECHNOLOGIST: Phyllis Davila RDCS  Signed: 2018 03:14 PM    PHYSICIAN: Eric Singh MD  Signed: 2018 06:49 AM

## (undated) DEVICE — MAGNETIC DRAPE: Brand: DEVON

## (undated) DEVICE — KENDALL SCD EXPRESS SLEEVES, KNEE LENGTH, MEDIUM: Brand: KENDALL SCD

## (undated) DEVICE — 3M™ TEGADERM™ TRANSPARENT FILM DRESSING FRAME STYLE, 1624W, 2-3/8 IN X 2-3/4 IN (6 CM X 7 CM), 100/CT 4CT/CASE: Brand: 3M™ TEGADERM™

## (undated) DEVICE — SUTURE MCRYL SZ 3-0 L27IN ABSRB UD L24MM PS-1 3/8 CIR PRIM Y936H

## (undated) DEVICE — DRAIN KT WND 10FR RND 400ML --

## (undated) DEVICE — STERILE POLYISOPRENE POWDER-FREE SURGICAL GLOVES: Brand: PROTEXIS

## (undated) DEVICE — CODMAN® SURGICAL PATTIES 3/4" X 3/4" (1.91CM X 1.91CM): Brand: CODMAN®

## (undated) DEVICE — 1010 S-DRAPE TOWEL DRAPE 10/BX: Brand: STERI-DRAPE™

## (undated) DEVICE — ASTOUND STANDARD SURGICAL GOWN, XL: Brand: CONVERTORS

## (undated) DEVICE — SUTURE VCRL SZ 1 L36IN ABSRB UD L36MM CT-1 1/2 CIR J947H

## (undated) DEVICE — KIT POS W/ FOAM ARM CRADL SHEARGUARD CHST PD CVR FOR SPNL

## (undated) DEVICE — CONTAINER,SPECIMEN,3OZ,OR STRL: Brand: MEDLINE

## (undated) DEVICE — LAMINECTOMY RICHMOND-LF: Brand: MEDLINE INDUSTRIES, INC.

## (undated) DEVICE — ADHESIVE SKIN CLOSURE 4X22 CM PREMIERPRO EXOFINFUSION DISP

## (undated) DEVICE — STERILE POLYISOPRENE POWDER-FREE SURGICAL GLOVES WITH EMOLLIENT COATING: Brand: PROTEXIS

## (undated) DEVICE — 3M™ TEGADERM™ TRANSPARENT FILM DRESSING FRAME STYLE, 1626, 4 IN X 4-3/4 IN (10 CM X 12 CM), 50/CT 4CT/CASE: Brand: 3M™ TEGADERM™

## (undated) DEVICE — BONE WAX WHITE: Brand: BONE WAX WHITE

## (undated) DEVICE — AMD ANTIMICROBIAL DRAIN SPONGES, 6 PLY, 0.2% POLYHEXAMETHYLENE BIGUANIDE HCI (PHMB): Brand: EXCILON

## (undated) DEVICE — INTENDED FOR TISSUE SEPARATION, AND OTHER PROCEDURES THAT REQUIRE A SHARP SURGICAL BLADE TO PUNCTURE OR CUT.: Brand: BARD-PARKER ® CARBON RIB-BACK BLADES

## (undated) DEVICE — DRAPE,UTILITY,TAPE,15X26,STERILE: Brand: MEDLINE

## (undated) DEVICE — SYRINGE MED 20ML STD CLR PLAS LUERLOCK TIP N CTRL DISP

## (undated) DEVICE — SYR 10ML LUER LOK 1/5ML GRAD --

## (undated) DEVICE — STOPCOCK IV 3W --

## (undated) DEVICE — SYR LR LCK 1ML GRAD NSAF 30ML --

## (undated) DEVICE — ACCY PA100-A LEGEND LUB/DIFFUSER 4 PACK: Brand: MIDAS REX®

## (undated) DEVICE — INFECTION CONTROL KIT SYS

## (undated) DEVICE — COVER,TABLE,60X90,STERILE: Brand: MEDLINE

## (undated) DEVICE — WATERPROOF, BACTERIA PROOF DRESSING WITH ABSORBENT SEE THROUGH PAD: Brand: OPSITE POST-OP VISIBLE 25X10CM CTN 20

## (undated) DEVICE — SUTURE STRATAFIX SPRL SZ 1 L14IN ABSRB VLT L48CM CTX 1/2 SXPD2B405

## (undated) DEVICE — NDL SPNE QNCKE 18GX3.5IN LF --

## (undated) DEVICE — Device

## (undated) DEVICE — DEVON™ KNEE AND BODY STRAP 60" X 3" (1.5 M X 7.6 CM): Brand: DEVON

## (undated) DEVICE — SUTURE VCRL SZ 2-0 L27IN ABSRB UD L26MM CT-2 1/2 CIR J269H

## (undated) DEVICE — (D)PREP SKN CHLRAPRP APPL 26ML -- CONVERT TO ITEM 371833

## (undated) DEVICE — DRSG PATCH ANTIMIC 1INX4.0MM -- CONVERT TO ITEM 356053

## (undated) DEVICE — ELECTRODE BLDE L4IN NONINSULATED EDGE

## (undated) DEVICE — CATHETER IV 14GA L1.25IN TEF STR HUB INTROCAN SFTY

## (undated) DEVICE — SOLUTION IRRIG 1000ML H2O STRL BLT

## (undated) DEVICE — DRAPE XR C ARM 41X74IN LF --

## (undated) DEVICE — BIPOLAR FORCEPS CORD: Brand: VALLEYLAB

## (undated) DEVICE — MEDI-VAC NON-CONDUCTIVE SUCTION TUBING: Brand: CARDINAL HEALTH

## (undated) DEVICE — 3000CC GUARDIAN II: Brand: GUARDIAN

## (undated) DEVICE — TRAY CATH OD16FR SIL URIN M STATLOK STBL DEV SURSTP

## (undated) DEVICE — TOOL 14MH30 LEGEND 14CM 3MM: Brand: MIDAS REX ™

## (undated) DEVICE — NEEDLE HYPO 22GA L1.5IN BLK S STL HUB POLYPR SHLD REG BVL

## (undated) DEVICE — THE MILL DISPOSABLE - MEDIUM

## (undated) DEVICE — DRAPE,REIN 53X77,STERILE: Brand: MEDLINE

## (undated) DEVICE — TIP CLEANER: Brand: VALLEYLAB

## (undated) DEVICE — LIGHT HANDLE: Brand: DEVON